# Patient Record
Sex: MALE | Race: WHITE | Employment: FULL TIME | URBAN - METROPOLITAN AREA
[De-identification: names, ages, dates, MRNs, and addresses within clinical notes are randomized per-mention and may not be internally consistent; named-entity substitution may affect disease eponyms.]

---

## 2019-04-15 ENCOUNTER — HOSPITAL ENCOUNTER (OUTPATIENT)
Dept: MRI IMAGING | Age: 59
Discharge: HOME | End: 2019-04-17
Payer: COMMERCIAL

## 2019-04-15 ENCOUNTER — HOSPITAL ENCOUNTER (OUTPATIENT)
Dept: GENERAL RADIOLOGY | Age: 59
Discharge: HOME | End: 2019-04-17
Payer: COMMERCIAL

## 2019-04-15 DIAGNOSIS — M84.822: ICD-10-CM

## 2019-04-15 DIAGNOSIS — T15.90XA FOREIGN BODY, EYE, UNSPECIFIED LATERALITY, INITIAL ENCOUNTER: ICD-10-CM

## 2019-04-15 PROCEDURE — 70030 X-RAY EYE FOR FOREIGN BODY: CPT

## 2019-04-15 PROCEDURE — 73221 MRI JOINT UPR EXTREM W/O DYE: CPT

## 2019-07-02 ENCOUNTER — HOSPITAL ENCOUNTER (OUTPATIENT)
Age: 59
Discharge: HOME | End: 2019-07-04
Payer: COMMERCIAL

## 2019-07-02 LAB
CHOLESTEROL, TOTAL: 182 MG/DL (ref 0–199)
HDLC SERPL-MCNC: 38 MG/DL
LDL CHOLESTEROL CALCULATED: 119 MG/DL (ref 0–99)
TRIGL SERPL-MCNC: 125 MG/DL (ref 0–149)
VLDLC SERPL CALC-MCNC: 25 MG/DL

## 2019-07-02 PROCEDURE — 80061 LIPID PANEL: CPT

## 2019-07-02 PROCEDURE — 36415 COLL VENOUS BLD VENIPUNCTURE: CPT

## 2019-07-05 VITALS
DIASTOLIC BLOOD PRESSURE: 84 MMHG | BODY MASS INDEX: 23.63 KG/M2 | HEART RATE: 72 BPM | WEIGHT: 147 LBS | HEIGHT: 66 IN | SYSTOLIC BLOOD PRESSURE: 138 MMHG

## 2019-07-05 RX ORDER — OMEPRAZOLE 20 MG/1
20 CAPSULE, DELAYED RELEASE ORAL DAILY
COMMUNITY
End: 2019-07-08 | Stop reason: SDUPTHER

## 2019-07-05 RX ORDER — SIMVASTATIN 20 MG
20 TABLET ORAL DAILY
COMMUNITY
End: 2019-07-08 | Stop reason: SDUPTHER

## 2019-07-05 RX ORDER — LISINOPRIL 10 MG/1
10 TABLET ORAL DAILY
COMMUNITY
End: 2019-07-08

## 2019-07-05 RX ORDER — FLUTICASONE PROPIONATE 50 MCG
2 SPRAY, SUSPENSION (ML) NASAL DAILY
COMMUNITY
End: 2019-07-08 | Stop reason: SDUPTHER

## 2019-07-08 ENCOUNTER — OFFICE VISIT (OUTPATIENT)
Dept: FAMILY MEDICINE CLINIC | Age: 59
End: 2019-07-08
Payer: COMMERCIAL

## 2019-07-08 VITALS
RESPIRATION RATE: 16 BRPM | HEIGHT: 66 IN | HEART RATE: 68 BPM | WEIGHT: 147 LBS | OXYGEN SATURATION: 99 % | BODY MASS INDEX: 23.63 KG/M2 | SYSTOLIC BLOOD PRESSURE: 150 MMHG | DIASTOLIC BLOOD PRESSURE: 80 MMHG

## 2019-07-08 DIAGNOSIS — Z12.5 PROSTATE CANCER SCREENING: ICD-10-CM

## 2019-07-08 DIAGNOSIS — M54.16 LUMBAR RADICULOPATHY: Primary | ICD-10-CM

## 2019-07-08 DIAGNOSIS — K21.9 GASTROESOPHAGEAL REFLUX DISEASE WITHOUT ESOPHAGITIS: ICD-10-CM

## 2019-07-08 DIAGNOSIS — I10 ESSENTIAL HYPERTENSION: ICD-10-CM

## 2019-07-08 DIAGNOSIS — E78.5 HYPERLIPIDEMIA, UNSPECIFIED HYPERLIPIDEMIA TYPE: ICD-10-CM

## 2019-07-08 DIAGNOSIS — Z86.010 HISTORY OF COLONIC POLYPS: ICD-10-CM

## 2019-07-08 PROCEDURE — 99214 OFFICE O/P EST MOD 30 MIN: CPT | Performed by: FAMILY MEDICINE

## 2019-07-08 RX ORDER — OMEPRAZOLE 20 MG/1
20 CAPSULE, DELAYED RELEASE ORAL DAILY
Qty: 90 CAPSULE | Refills: 1 | Status: SHIPPED | OUTPATIENT
Start: 2019-07-08 | End: 2020-01-16 | Stop reason: SDUPTHER

## 2019-07-08 RX ORDER — FLUTICASONE PROPIONATE 50 MCG
2 SPRAY, SUSPENSION (ML) NASAL DAILY
Qty: 3 BOTTLE | Refills: 1 | Status: SHIPPED | OUTPATIENT
Start: 2019-07-08 | End: 2020-01-16 | Stop reason: SDUPTHER

## 2019-07-08 RX ORDER — LISINOPRIL 20 MG/1
20 TABLET ORAL DAILY
Qty: 90 TABLET | Refills: 1 | Status: SHIPPED | OUTPATIENT
Start: 2019-07-08 | End: 2020-01-16 | Stop reason: SDUPTHER

## 2019-07-08 RX ORDER — SIMVASTATIN 20 MG
20 TABLET ORAL DAILY
Qty: 90 TABLET | Refills: 1 | Status: SHIPPED | OUTPATIENT
Start: 2019-07-08 | End: 2020-01-16 | Stop reason: SDUPTHER

## 2019-07-08 RX ORDER — LISINOPRIL 20 MG/1
20 TABLET ORAL DAILY
COMMUNITY
End: 2019-07-08 | Stop reason: SDUPTHER

## 2019-07-08 RX ORDER — LISINOPRIL 10 MG/1
10 TABLET ORAL DAILY
Qty: 90 TABLET | Refills: 1 | Status: CANCELLED | OUTPATIENT
Start: 2019-07-08

## 2019-07-08 ASSESSMENT — ENCOUNTER SYMPTOMS
ALLERGIC/IMMUNOLOGIC NEGATIVE: 1
CHEST TIGHTNESS: 0
BACK PAIN: 1
SHORTNESS OF BREATH: 0
ABDOMINAL PAIN: 0

## 2019-07-08 ASSESSMENT — PATIENT HEALTH QUESTIONNAIRE - PHQ9
2. FEELING DOWN, DEPRESSED OR HOPELESS: 0
SUM OF ALL RESPONSES TO PHQ QUESTIONS 1-9: 0
SUM OF ALL RESPONSES TO PHQ QUESTIONS 1-9: 0
1. LITTLE INTEREST OR PLEASURE IN DOING THINGS: 0
SUM OF ALL RESPONSES TO PHQ9 QUESTIONS 1 & 2: 0

## 2019-07-08 NOTE — PROGRESS NOTES
07/08/19     Allison You     1960     60 y.o. Chief Complaint   Patient presents with    Hypertension    Hyperlipidemia    Gastroesophageal Reflux        Patient presents for recheck of hypertension and hyperlipidemia. Denies chest pain, edema, fatigue, palpitations and syncope. Compliance reviewed. No medication side effects noted. Pt c/o increased pain and swelling right knee       Review of Systems   Constitutional: Negative for activity change and appetite change. HENT: Negative for congestion. Eyes: Negative for visual disturbance. Respiratory: Negative for chest tightness and shortness of breath. Cardiovascular: Negative for chest pain, palpitations and leg swelling. Gastrointestinal: Negative for abdominal pain. Endocrine: Negative for cold intolerance and heat intolerance. Genitourinary: Negative for difficulty urinating. Musculoskeletal: Positive for arthralgias and back pain. Skin: Negative. Negative for rash. Allergic/Immunologic: Negative. Neurological: Negative for dizziness, syncope and light-headedness. Hematological: Negative. Negative for adenopathy. Psychiatric/Behavioral: Negative. No problem-specific Assessment & Plan notes found for this encounter. Current Outpatient Medications   Medication Sig Dispense Refill    fluticasone (FLONASE) 50 MCG/ACT nasal spray 2 sprays by Each Nostril route daily 3 Bottle 1    simvastatin (ZOCOR) 20 MG tablet Take 1 tablet by mouth daily 90 tablet 1    omeprazole (PRILOSEC) 20 MG delayed release capsule Take 1 capsule by mouth daily 90 capsule 1    lisinopril (PRINIVIL;ZESTRIL) 20 MG tablet Take 1 tablet by mouth daily 90 tablet 1     No current facility-administered medications for this visit.          No Known Allergies     Social History     Socioeconomic History    Marital status: Single     Spouse name: Not on file    Number of children: Not on file    Years of education: Not on file   

## 2020-01-09 ENCOUNTER — HOSPITAL ENCOUNTER (OUTPATIENT)
Age: 60
Discharge: HOME | End: 2020-01-11
Payer: COMMERCIAL

## 2020-01-09 LAB
ALBUMIN SERPL-MCNC: 4.4 G/DL (ref 3.5–5.2)
ALP BLD-CCNC: 71 U/L (ref 40–129)
ALT SERPL-CCNC: 12 U/L (ref 0–40)
ANION GAP SERPL CALCULATED.3IONS-SCNC: 16 MMOL/L (ref 7–16)
AST SERPL-CCNC: 18 U/L (ref 0–39)
BASOPHILS ABSOLUTE: 0.11 E9/L (ref 0–0.2)
BASOPHILS RELATIVE PERCENT: 1.6 % (ref 0–2)
BILIRUB SERPL-MCNC: 0.4 MG/DL (ref 0–1.2)
BUN BLDV-MCNC: 9 MG/DL (ref 8–23)
CALCIUM SERPL-MCNC: 9.4 MG/DL (ref 8.6–10.2)
CHLORIDE BLD-SCNC: 100 MMOL/L (ref 98–107)
CHOLESTEROL, TOTAL: 167 MG/DL (ref 0–199)
CO2: 22 MMOL/L (ref 22–29)
CREAT SERPL-MCNC: 0.8 MG/DL (ref 0.7–1.2)
EOSINOPHILS ABSOLUTE: 0.63 E9/L (ref 0.05–0.5)
EOSINOPHILS RELATIVE PERCENT: 8.9 % (ref 0–6)
GFR AFRICAN AMERICAN: >60
GFR NON-AFRICAN AMERICAN: >60 ML/MIN/1.73
GLUCOSE BLD-MCNC: 104 MG/DL (ref 74–99)
HCT VFR BLD CALC: 46.3 % (ref 37–54)
HDLC SERPL-MCNC: 33 MG/DL
HEMOGLOBIN: 15.2 G/DL (ref 12.5–16.5)
IMMATURE GRANULOCYTES #: 0.02 E9/L
IMMATURE GRANULOCYTES %: 0.3 % (ref 0–5)
LDL CHOLESTEROL CALCULATED: 118 MG/DL (ref 0–99)
LYMPHOCYTES ABSOLUTE: 2.03 E9/L (ref 1.5–4)
LYMPHOCYTES RELATIVE PERCENT: 28.7 % (ref 20–42)
MCH RBC QN AUTO: 29.7 PG (ref 26–35)
MCHC RBC AUTO-ENTMCNC: 32.8 % (ref 32–34.5)
MCV RBC AUTO: 90.6 FL (ref 80–99.9)
MONOCYTES ABSOLUTE: 0.89 E9/L (ref 0.1–0.95)
MONOCYTES RELATIVE PERCENT: 12.6 % (ref 2–12)
NEUTROPHILS ABSOLUTE: 3.4 E9/L (ref 1.8–7.3)
NEUTROPHILS RELATIVE PERCENT: 47.9 % (ref 43–80)
PDW BLD-RTO: 12.7 FL (ref 11.5–15)
PLATELET # BLD: 201 E9/L (ref 130–450)
PMV BLD AUTO: 11.8 FL (ref 7–12)
POTASSIUM SERPL-SCNC: 4.7 MMOL/L (ref 3.5–5)
PROSTATE SPECIFIC ANTIGEN: 2.15 NG/ML (ref 0–4)
RBC # BLD: 5.11 E12/L (ref 3.8–5.8)
SODIUM BLD-SCNC: 138 MMOL/L (ref 132–146)
TOTAL PROTEIN: 6.9 G/DL (ref 6.4–8.3)
TRIGL SERPL-MCNC: 82 MG/DL (ref 0–149)
VLDLC SERPL CALC-MCNC: 16 MG/DL
WBC # BLD: 7.1 E9/L (ref 4.5–11.5)

## 2020-01-09 PROCEDURE — G0103 PSA SCREENING: HCPCS

## 2020-01-09 PROCEDURE — 80053 COMPREHEN METABOLIC PANEL: CPT

## 2020-01-09 PROCEDURE — 36415 COLL VENOUS BLD VENIPUNCTURE: CPT

## 2020-01-09 PROCEDURE — 85025 COMPLETE CBC W/AUTO DIFF WBC: CPT

## 2020-01-09 PROCEDURE — 80061 LIPID PANEL: CPT

## 2020-01-14 ASSESSMENT — ENCOUNTER SYMPTOMS
CHEST TIGHTNESS: 0
SHORTNESS OF BREATH: 0
ABDOMINAL PAIN: 0
BLOOD IN STOOL: 0

## 2020-01-14 NOTE — PROGRESS NOTES
Left     SIDE OF FACE     Family History   Problem Relation Age of Onset    Breast Cancer Mother     High Blood Pressure Father     Heart Disease Father     Heart Attack Father     Prostate Cancer Father      Social History     Socioeconomic History    Marital status: Single     Spouse name: Not on file    Number of children: Not on file    Years of education: Not on file    Highest education level: Not on file   Occupational History    Not on file   Social Needs    Financial resource strain: Not on file    Food insecurity:     Worry: Not on file     Inability: Not on file    Transportation needs:     Medical: Not on file     Non-medical: Not on file   Tobacco Use    Smoking status: Current Every Day Smoker     Packs/day: 1.00     Years: 48.00     Pack years: 48.00     Types: Cigarettes    Smokeless tobacco: Never Used   Substance and Sexual Activity    Alcohol use: Never     Frequency: Never    Drug use: Never    Sexual activity: Not on file   Lifestyle    Physical activity:     Days per week: Not on file     Minutes per session: Not on file    Stress: Not on file   Relationships    Social connections:     Talks on phone: Not on file     Gets together: Not on file     Attends [employer] service: Not on file     Active member of club or organization: Not on file     Attends meetings of clubs or organizations: Not on file     Relationship status: Not on file    Intimate partner violence:     Fear of current or ex partner: Not on file     Emotionally abused: Not on file     Physically abused: Not on file     Forced sexual activity: Not on file   Other Topics Concern    Not on file   Social History Narrative    Not on file       Vitals:    01/16/20 0707 01/16/20 0722   BP: 120/66 118/70   Site: Right Upper Arm    Position: Sitting    Cuff Size: Medium Adult    Pulse: 87    Temp: 97.3 °F (36.3 °C)    TempSrc: Temporal    SpO2: 97%    Weight: 143 lb (64.9 kg)    Height: 5' 6\" (1.676 m) 20mg  Orders:  -     omeprazole (PRILOSEC) 20 MG delayed release capsule; Take 1 capsule by mouth daily    Encounter for screening colonoscopy  -     Jeremy Barragan MD, General Surgery, [facility]    Other orders  -     fluticasone (FLONASE) 50 MCG/ACT nasal spray; 2 sprays by Each Nostril route daily          Return in about 6 months (around 7/16/2020). Seen By:  Marcus Garrett, DO          This note has been transcribed by Efrem Avery under the direction of Dr. Jacki Dominguez. I, Dr. Filipe Tong, personally performed the services described in this documentation as scribed by Efrem Avery in my presence, and it is both accurate and complete.

## 2020-01-16 ENCOUNTER — OFFICE VISIT (OUTPATIENT)
Dept: FAMILY MEDICINE CLINIC | Age: 60
End: 2020-01-16
Payer: COMMERCIAL

## 2020-01-16 ENCOUNTER — TELEPHONE (OUTPATIENT)
Dept: HEMATOLOGY | Age: 60
End: 2020-01-16

## 2020-01-16 VITALS
SYSTOLIC BLOOD PRESSURE: 118 MMHG | OXYGEN SATURATION: 97 % | HEART RATE: 87 BPM | TEMPERATURE: 97.3 F | WEIGHT: 143 LBS | DIASTOLIC BLOOD PRESSURE: 70 MMHG | BODY MASS INDEX: 22.98 KG/M2 | HEIGHT: 66 IN

## 2020-01-16 PROBLEM — K21.9 GASTROESOPHAGEAL REFLUX DISEASE WITHOUT ESOPHAGITIS: Status: ACTIVE | Noted: 2020-01-16

## 2020-01-16 LAB
BILIRUBIN, POC: NORMAL
BLOOD URINE, POC: NORMAL
CLARITY, POC: CLEAR
COLOR, POC: YELLOW
GLUCOSE URINE, POC: NORMAL
KETONES, POC: NORMAL
LEUKOCYTE EST, POC: NORMAL
NITRITE, POC: NORMAL
PH, POC: 7
PROTEIN, POC: NORMAL
SPECIFIC GRAVITY, POC: 1.01
UROBILINOGEN, POC: 1

## 2020-01-16 PROCEDURE — G8420 CALC BMI NORM PARAMETERS: HCPCS | Performed by: FAMILY MEDICINE

## 2020-01-16 PROCEDURE — 81002 URINALYSIS NONAUTO W/O SCOPE: CPT | Performed by: FAMILY MEDICINE

## 2020-01-16 PROCEDURE — 4004F PT TOBACCO SCREEN RCVD TLK: CPT | Performed by: FAMILY MEDICINE

## 2020-01-16 PROCEDURE — G8484 FLU IMMUNIZE NO ADMIN: HCPCS | Performed by: FAMILY MEDICINE

## 2020-01-16 PROCEDURE — G8427 DOCREV CUR MEDS BY ELIG CLIN: HCPCS | Performed by: FAMILY MEDICINE

## 2020-01-16 PROCEDURE — 99214 OFFICE O/P EST MOD 30 MIN: CPT | Performed by: FAMILY MEDICINE

## 2020-01-16 PROCEDURE — 3017F COLORECTAL CA SCREEN DOC REV: CPT | Performed by: FAMILY MEDICINE

## 2020-01-16 RX ORDER — LISINOPRIL 20 MG/1
20 TABLET ORAL DAILY
Qty: 90 TABLET | Refills: 2 | Status: SHIPPED
Start: 2020-01-16 | End: 2020-06-30 | Stop reason: SDUPTHER

## 2020-01-16 RX ORDER — SIMVASTATIN 20 MG
20 TABLET ORAL DAILY
Qty: 90 TABLET | Refills: 2 | Status: SHIPPED
Start: 2020-01-16 | End: 2020-06-30 | Stop reason: SDUPTHER

## 2020-01-16 RX ORDER — FLUTICASONE PROPIONATE 50 MCG
2 SPRAY, SUSPENSION (ML) NASAL DAILY
Qty: 1 BOTTLE | Refills: 6 | Status: SHIPPED
Start: 2020-01-16 | End: 2020-06-30 | Stop reason: SDUPTHER

## 2020-01-16 RX ORDER — OMEPRAZOLE 20 MG/1
20 CAPSULE, DELAYED RELEASE ORAL DAILY
Qty: 90 CAPSULE | Refills: 2 | Status: SHIPPED
Start: 2020-01-16 | End: 2020-06-30 | Stop reason: SDUPTHER

## 2020-01-16 SDOH — HEALTH STABILITY: MENTAL HEALTH: HOW OFTEN DO YOU HAVE A DRINK CONTAINING ALCOHOL?: NEVER

## 2020-01-16 ASSESSMENT — PATIENT HEALTH QUESTIONNAIRE - PHQ9
SUM OF ALL RESPONSES TO PHQ9 QUESTIONS 1 & 2: 0
SUM OF ALL RESPONSES TO PHQ QUESTIONS 1-9: 0
1. LITTLE INTEREST OR PLEASURE IN DOING THINGS: 0
2. FEELING DOWN, DEPRESSED OR HOPELESS: 0
SUM OF ALL RESPONSES TO PHQ QUESTIONS 1-9: 0

## 2020-01-21 ENCOUNTER — TELEPHONE (OUTPATIENT)
Dept: HEMATOLOGY | Age: 60
End: 2020-01-21

## 2020-01-31 ENCOUNTER — TELEPHONE (OUTPATIENT)
Dept: HEMATOLOGY | Age: 60
End: 2020-01-31

## 2020-02-03 ENCOUNTER — TELEPHONE (OUTPATIENT)
Dept: HEMATOLOGY | Age: 60
End: 2020-02-03

## 2020-02-03 ENCOUNTER — OFFICE VISIT (OUTPATIENT)
Dept: SURGERY | Age: 60
End: 2020-02-03

## 2020-02-03 VITALS
HEART RATE: 74 BPM | WEIGHT: 145 LBS | HEIGHT: 66 IN | SYSTOLIC BLOOD PRESSURE: 150 MMHG | TEMPERATURE: 98.4 F | DIASTOLIC BLOOD PRESSURE: 77 MMHG | BODY MASS INDEX: 23.3 KG/M2

## 2020-02-03 PROCEDURE — 99999 PR OFFICE/OUTPT VISIT,PROCEDURE ONLY: CPT | Performed by: TRANSPLANT SURGERY

## 2020-02-03 ASSESSMENT — ENCOUNTER SYMPTOMS
NAUSEA: 0
SHORTNESS OF BREATH: 0
BACK PAIN: 0
PHOTOPHOBIA: 0
ABDOMINAL PAIN: 0
EYE PAIN: 0
BLOOD IN STOOL: 0
VOMITING: 0
CONSTIPATION: 0
EYE DISCHARGE: 0
DIARRHEA: 0

## 2020-02-03 NOTE — PROGRESS NOTES
Medical: Not on file     Non-medical: Not on file   Tobacco Use    Smoking status: Current Every Day Smoker     Packs/day: 1.00     Years: 48.00     Pack years: 48.00     Types: Cigarettes    Smokeless tobacco: Never Used   Substance and Sexual Activity    Alcohol use: Never     Frequency: Never    Drug use: Never    Sexual activity: Not on file   Lifestyle    Physical activity:     Days per week: Not on file     Minutes per session: Not on file    Stress: Not on file   Relationships    Social connections:     Talks on phone: Not on file     Gets together: Not on file     Attends Worship service: Not on file     Active member of club or organization: Not on file     Attends meetings of clubs or organizations: Not on file     Relationship status: Not on file    Intimate partner violence:     Fear of current or ex partner: Not on file     Emotionally abused: Not on file     Physically abused: Not on file     Forced sexual activity: Not on file   Other Topics Concern    Not on file   Social History Narrative    Not on file       ROS:   Review of Systems   Constitutional: Negative for chills, diaphoresis and fever. HENT: Negative for congestion, ear discharge, ear pain, hearing loss, nosebleeds and tinnitus. Eyes: Negative for photophobia, pain and discharge. Respiratory: Negative for shortness of breath. Cardiovascular: Negative for palpitations and leg swelling. Gastrointestinal: Negative for abdominal pain, blood in stool, constipation, diarrhea, nausea and vomiting. Endocrine: Negative for polydipsia. Genitourinary: Negative for frequency, hematuria and urgency. Musculoskeletal: Negative for back pain and neck pain. Skin: Negative for rash. Allergic/Immunologic: Negative for environmental allergies. Neurological: Negative for tremors and seizures. Psychiatric/Behavioral: Negative for hallucinations and suicidal ideas. The patient is not nervous/anxious.         Physical

## 2020-03-15 ENCOUNTER — ANESTHESIA EVENT (OUTPATIENT)
Dept: ENDOSCOPY | Age: 60
End: 2020-03-15
Payer: COMMERCIAL

## 2020-03-15 ASSESSMENT — LIFESTYLE VARIABLES: SMOKING_STATUS: 1

## 2020-03-15 NOTE — H&P
Hepatobiliary and Pancreatic Surgery Attending History and Physical     Patient's Name/Date of Birth: Hill Thomas /1960 (30 y.o.)     Date: March 15, 2020      CC: Screening colonoscopy     HPI:  Patient is a very pleasant 60year old male whom had a colonoscopy about 6 years ago. He states that polyps were removed. He denies any weight loss. He has never had diverticulitis. Denies a family history of colon cancer. He moves his bowels daily. He has had an appendectomy and a hernia repair.   He smokes about 1.5 PPD.       Past Medical History        Past Medical History:   Diagnosis Date    Hyperlipidemia      Hypertension              Past Surgical History         Past Surgical History:   Procedure Laterality Date    APPENDECTOMY        HERNIA REPAIR        SKIN CANCER EXCISION Left       SIDE OF FACE            Current Facility-Administered Medications          Current Outpatient Medications   Medication Sig Dispense Refill    lisinopril (PRINIVIL;ZESTRIL) 20 MG tablet Take 1 tablet by mouth daily 90 tablet 2    omeprazole (PRILOSEC) 20 MG delayed release capsule Take 1 capsule by mouth daily 90 capsule 2    fluticasone (FLONASE) 50 MCG/ACT nasal spray 2 sprays by Each Nostril route daily 1 Bottle 6    simvastatin (ZOCOR) 20 MG tablet Take 1 tablet by mouth daily 90 tablet 2      No current facility-administered medications for this visit.             No Known Allergies     Family History         Family History   Problem Relation Age of Onset    Breast Cancer Mother      Cancer Mother      High Blood Pressure Father      Heart Disease Father      Heart Attack Father      Prostate Cancer Father      Prostate Cancer Sister      Other Sister              Social History               Socioeconomic History    Marital status: Single       Spouse name: Not on file    Number of children: Not on file    Years of education: Not on file    Highest education level: Not on file   Occupational History    Not on file   Social Needs    Financial resource strain: Not on file    Food insecurity:       Worry: Not on file       Inability: Not on file    Transportation needs:       Medical: Not on file       Non-medical: Not on file   Tobacco Use    Smoking status: Current Every Day Smoker       Packs/day: 1.00       Years: 48.00       Pack years: 48.00       Types: Cigarettes    Smokeless tobacco: Never Used   Substance and Sexual Activity    Alcohol use: Never       Frequency: Never    Drug use: Never    Sexual activity: Not on file   Lifestyle    Physical activity:       Days per week: Not on file       Minutes per session: Not on file    Stress: Not on file   Relationships    Social connections:       Talks on phone: Not on file       Gets together: Not on file       Attends [employer] service: Not on file       Active member of club or organization: Not on file       Attends meetings of clubs or organizations: Not on file       Relationship status: Not on file    Intimate partner violence:       Fear of current or ex partner: Not on file       Emotionally abused: Not on file       Physically abused: Not on file       Forced sexual activity: Not on file   Other Topics Concern    Not on file   Social History Narrative    Not on file            ROS:   Review of Systems   Constitutional: Negative for chills, diaphoresis and fever. HENT: Negative for congestion, ear discharge, ear pain, hearing loss, nosebleeds and tinnitus. Eyes: Negative for photophobia, pain and discharge. Respiratory: Negative for shortness of breath. Cardiovascular: Negative for palpitations and leg swelling. Gastrointestinal: Negative for abdominal pain, blood in stool, constipation, diarrhea, nausea and vomiting. Endocrine: Negative for polydipsia. Genitourinary: Negative for frequency, hematuria and urgency. Musculoskeletal: Negative for back pain and neck pain. Skin: Negative for rash.

## 2020-03-16 ENCOUNTER — HOSPITAL ENCOUNTER (OUTPATIENT)
Age: 60
Setting detail: OUTPATIENT SURGERY
Discharge: HOME | End: 2020-03-16
Attending: TRANSPLANT SURGERY | Admitting: TRANSPLANT SURGERY
Payer: COMMERCIAL

## 2020-03-16 ENCOUNTER — ANESTHESIA (OUTPATIENT)
Dept: ENDOSCOPY | Age: 60
End: 2020-03-16
Payer: COMMERCIAL

## 2020-03-16 VITALS
HEIGHT: 66 IN | HEART RATE: 71 BPM | RESPIRATION RATE: 18 BRPM | DIASTOLIC BLOOD PRESSURE: 78 MMHG | WEIGHT: 143 LBS | OXYGEN SATURATION: 99 % | BODY MASS INDEX: 22.98 KG/M2 | TEMPERATURE: 97.9 F | SYSTOLIC BLOOD PRESSURE: 129 MMHG

## 2020-03-16 VITALS
OXYGEN SATURATION: 100 % | DIASTOLIC BLOOD PRESSURE: 80 MMHG | RESPIRATION RATE: 17 BRPM | SYSTOLIC BLOOD PRESSURE: 156 MMHG

## 2020-03-16 PROBLEM — Z12.11 ENCOUNTER FOR SCREENING COLONOSCOPY: Status: ACTIVE | Noted: 2020-03-16

## 2020-03-16 PROCEDURE — 7100000010 HC PHASE II RECOVERY - FIRST 15 MIN: Performed by: TRANSPLANT SURGERY

## 2020-03-16 PROCEDURE — 2580000003 HC RX 258: Performed by: NURSE ANESTHETIST, CERTIFIED REGISTERED

## 2020-03-16 PROCEDURE — 3700000001 HC ADD 15 MINUTES (ANESTHESIA): Performed by: TRANSPLANT SURGERY

## 2020-03-16 PROCEDURE — 6360000002 HC RX W HCPCS: Performed by: NURSE ANESTHETIST, CERTIFIED REGISTERED

## 2020-03-16 PROCEDURE — 3609027000 HC COLONOSCOPY: Performed by: TRANSPLANT SURGERY

## 2020-03-16 PROCEDURE — 2709999900 HC NON-CHARGEABLE SUPPLY: Performed by: TRANSPLANT SURGERY

## 2020-03-16 PROCEDURE — 7100000011 HC PHASE II RECOVERY - ADDTL 15 MIN: Performed by: TRANSPLANT SURGERY

## 2020-03-16 PROCEDURE — 45378 DIAGNOSTIC COLONOSCOPY: CPT | Performed by: TRANSPLANT SURGERY

## 2020-03-16 PROCEDURE — 3700000000 HC ANESTHESIA ATTENDED CARE: Performed by: TRANSPLANT SURGERY

## 2020-03-16 RX ORDER — PROPOFOL 10 MG/ML
INJECTION, EMULSION INTRAVENOUS PRN
Status: DISCONTINUED | OUTPATIENT
Start: 2020-03-16 | End: 2020-03-16 | Stop reason: SDUPTHER

## 2020-03-16 RX ORDER — SODIUM CHLORIDE 0.9 % (FLUSH) 0.9 %
10 SYRINGE (ML) INJECTION EVERY 12 HOURS SCHEDULED
Status: DISCONTINUED | OUTPATIENT
Start: 2020-03-16 | End: 2020-03-17 | Stop reason: HOSPADM

## 2020-03-16 RX ORDER — SODIUM CHLORIDE 9 MG/ML
INJECTION, SOLUTION INTRAVENOUS CONTINUOUS PRN
Status: DISCONTINUED | OUTPATIENT
Start: 2020-03-16 | End: 2020-03-16 | Stop reason: SDUPTHER

## 2020-03-16 RX ORDER — SODIUM CHLORIDE 0.9 % (FLUSH) 0.9 %
10 SYRINGE (ML) INJECTION PRN
Status: DISCONTINUED | OUTPATIENT
Start: 2020-03-16 | End: 2020-03-17 | Stop reason: HOSPADM

## 2020-03-16 RX ADMIN — PROPOFOL 250 MG: 10 INJECTION, EMULSION INTRAVENOUS at 12:28

## 2020-03-16 RX ADMIN — SODIUM CHLORIDE: 9 INJECTION, SOLUTION INTRAVENOUS at 12:18

## 2020-03-16 NOTE — OP NOTE
PROCEDURE NOTE    DATE OF PROCEDURE: 3/16/2020    SURGEON: Chyrl Shone, MD    ASSISTANT: None    PREOPERATIVE DIAGNOSIS: Low risk colorectal cancer screening    POSTOPERATIVE DIAGNOSIS: Same, tortuous colon    OPERATION: Total colonoscopy to the cecum    ANESTHESIA: Local monitored anesthesia. ESTIMATED BLOOD LOSS (ml): less than 50     COMPLICATIONS: None    SPECIMENS:  Was Not Obtained    HISTORY: The patient is a 60y.o. year old male with history of above preop diagnosis. I recommended colonoscopy with possible biopsy or polypectomy and I explained the risk, benefits, expected outcome, and alternatives to the procedure. Risks included but are not limited to bleeding, infection, respiratory distress, hypotension, and perforation of the colon. The patient understands and is in agreement. PROCEDURE: The patient was given IV conscious sedation per anesthesia. The patient was given supplemental oxygen by nasal cannula. The colonoscope was inserted per rectum and advanced under direct vision to the cecum with difficulty. The prep was good so exam was adequate. FINDINGS:  Cecum/Ascending colon: normal    Transverse colon: tortuous but normal    Descending/Sigmoid colon: tortuous but normal    Rectum/Anus: examined in normal and retroflexed positions and was normal    The colon was decompressed and the scope was removed. The withdraw time was approximately 13 minutes. The patient tolerated the procedure well. ASSESSMENT/PLAN:   1.  Recommend follow up colonoscopy in 5 years    Electronically signed by Chyrl Shone, MD on 3/16/20 at 1:23 PM EDT

## 2020-03-16 NOTE — INTERVAL H&P NOTE
H&P Update    Patient's History and Physical from March 15, 2020 was reviewed. Patient examined. There has been no change.     Electronically signed by Vj Singh MD on 3/16/20 at 1:18 PM EDT

## 2020-03-16 NOTE — ANESTHESIA PRE PROCEDURE
Department of Anesthesiology  Preprocedure Note       Name:  Dee Fitch   Age:  60 y.o.  :  1960                                          MRN:  [id number]         Date:  3/16/2020      Surgeon: Diego King):  Ping Das MD    Procedure: COLORECTAL CANCER SCREENING, NOT HIGH RISK (N/A )    Medications prior to admission:   Prior to Admission medications    Medication Sig Start Date End Date Taking?  Authorizing Provider   lisinopril (PRINIVIL;ZESTRIL) 20 MG tablet Take 1 tablet by mouth daily 20  Yes Lencho Blake DO   omeprazole (PRILOSEC) 20 MG delayed release capsule Take 1 capsule by mouth daily 20  Yes Lencho Blake DO   fluticasone Jodean Llanes) 50 MCG/ACT nasal spray 2 sprays by Each Nostril route daily 20  Yes Lencho Blake DO   simvastatin (ZOCOR) 20 MG tablet Take 1 tablet by mouth daily 20  Yes Samm Johns DO       Current medications:    Current Facility-Administered Medications   Medication Dose Route Frequency Provider Last Rate Last Dose    sodium chloride flush 0.9 % injection 10 mL  10 mL Intravenous 2 times per day Amber Miles III, MD        sodium chloride flush 0.9 % injection 10 mL  10 mL Intravenous PRN Amber Miles III, MD           Allergies:  No Known Allergies    Problem List:    Patient Active Problem List   Diagnosis Code    Gastroesophageal reflux disease without esophagitis K21.9       Past Medical History:        Diagnosis Date    Heartburn     Hyperlipidemia     Hypertension     Polyp of colon     Smokers' cough ([facility] [address]        Past Surgical History:        Procedure Laterality Date    APPENDECTOMY      COLONOSCOPY      HERNIA REPAIR      SKIN CANCER EXCISION Left     SIDE OF FACE       Social History:    Social History     Tobacco Use    Smoking status: Current Every Day Smoker     Packs/day: 1.00     Years: 48.00     Pack years: 48.00     Types: Cigarettes    Smokeless tobacco: Never Used   Substance Use Topics    Alcohol use: Never     Frequency: Never                                Ready to quit: Not Answered  Counseling given: Not Answered      Vital Signs (Current):   Vitals:    03/10/20 1307 03/16/20 1100   BP:  (!) 185/80   Pulse:  80   Resp:  16   SpO2:  98%   Weight: 143 lb (64.9 kg)    Height: 5' 6\" (1.676 m)                                               BP Readings from Last 3 Encounters:   03/16/20 (!) 185/80   02/03/20 (!) 150/77   01/16/20 118/70       NPO Status: Time of last liquid consumption: 2300                        Time of last solid consumption: 1800                        Date of last liquid consumption: 03/15/20                        Date of last solid food consumption: 03/14/20    BMI:   Wt Readings from Last 3 Encounters:   03/10/20 143 lb (64.9 kg)   02/03/20 145 lb (65.8 kg)   01/16/20 143 lb (64.9 kg)     Body mass index is 23.08 kg/m². CBC:   Lab Results   Component Value Date    WBC 7.1 01/09/2020    RBC 5.11 01/09/2020    HGB 15.2 01/09/2020    HCT 46.3 01/09/2020    MCV 90.6 01/09/2020    RDW 12.7 01/09/2020     01/09/2020       CMP:   Lab Results   Component Value Date     01/09/2020    K 4.7 01/09/2020     01/09/2020    CO2 22 01/09/2020    BUN 9 01/09/2020    CREATININE 0.8 01/09/2020    GFRAA >60 01/09/2020    LABGLOM >60 01/09/2020    GLUCOSE 104 01/09/2020    PROT 6.9 01/09/2020    CALCIUM 9.4 01/09/2020    BILITOT 0.4 01/09/2020    ALKPHOS 71 01/09/2020    AST 18 01/09/2020    ALT 12 01/09/2020       POC Tests: No results for input(s): POCGLU, POCNA, POCK, POCCL, POCBUN, POCHEMO, POCHCT in the last 72 hours.     Coags: No results found for: PROTIME, INR, APTT    HCG (If Applicable): No results found for: PREGTESTUR, PREGSERUM, HCG, HCGQUANT     ABGs: No results found for: PHART, PO2ART, NKW0KXJ, JOL4TTO, BEART, [id number]     Type & Screen (If Applicable):  No results found for: ANAM [facility]    Anesthesia Evaluation  Patient summary reviewed no history of anesthetic complications:   Airway: Mallampati: II  TM distance: >3 FB     Mouth opening: > = 3 FB Dental:    (+) edentulous      Pulmonary: breath sounds clear to auscultation  (+) COPD:  current smoker                          ROS comment: Sinusitis - on prn flonase   Cardiovascular:    (+) hypertension:, hyperlipidemia    (-) past MI, CAD and CABG/stent      Rhythm: regular  Rate: normal                    Neuro/Psych:   Negative Neuro/Psych ROS              GI/Hepatic/Renal:   (+) GERD:, bowel prep,      (-) liver disease and no renal disease       Endo/Other: Negative Endo/Other ROS                    Abdominal:           Vascular: negative vascular ROS. Anesthesia Plan      MAC     ASA 3     (Backup GA if needed.  )  Induction: intravenous. Anesthetic plan and risks discussed with patient. Plan discussed with CRNA.                 Eh Roper MD   3/16/2020

## 2020-04-15 PROBLEM — Z12.11 ENCOUNTER FOR SCREENING COLONOSCOPY: Status: RESOLVED | Noted: 2020-03-16 | Resolved: 2020-04-15

## 2020-06-30 ENCOUNTER — OFFICE VISIT (OUTPATIENT)
Dept: FAMILY MEDICINE CLINIC | Age: 60
End: 2020-06-30
Payer: COMMERCIAL

## 2020-06-30 VITALS
RESPIRATION RATE: 18 BRPM | WEIGHT: 141 LBS | DIASTOLIC BLOOD PRESSURE: 68 MMHG | HEIGHT: 66 IN | SYSTOLIC BLOOD PRESSURE: 126 MMHG | TEMPERATURE: 97.5 F | OXYGEN SATURATION: 97 % | HEART RATE: 51 BPM | BODY MASS INDEX: 22.66 KG/M2

## 2020-06-30 PROCEDURE — 99213 OFFICE O/P EST LOW 20 MIN: CPT | Performed by: PHYSICIAN ASSISTANT

## 2020-06-30 RX ORDER — FLUTICASONE PROPIONATE 50 MCG
2 SPRAY, SUSPENSION (ML) NASAL DAILY
Qty: 1 BOTTLE | Refills: 6 | Status: SHIPPED | OUTPATIENT
Start: 2020-06-30

## 2020-06-30 RX ORDER — LISINOPRIL 20 MG/1
20 TABLET ORAL DAILY
Qty: 90 TABLET | Refills: 2 | Status: SHIPPED | OUTPATIENT
Start: 2020-06-30

## 2020-06-30 RX ORDER — SIMVASTATIN 20 MG
20 TABLET ORAL DAILY
Qty: 90 TABLET | Refills: 2 | Status: SHIPPED | OUTPATIENT
Start: 2020-06-30

## 2020-06-30 RX ORDER — OMEPRAZOLE 20 MG/1
20 CAPSULE, DELAYED RELEASE ORAL DAILY
Qty: 90 CAPSULE | Refills: 2 | Status: SHIPPED | OUTPATIENT
Start: 2020-06-30

## 2020-06-30 ASSESSMENT — ENCOUNTER SYMPTOMS
ALLERGIC/IMMUNOLOGIC NEGATIVE: 1
RESPIRATORY NEGATIVE: 1
GASTROINTESTINAL NEGATIVE: 1
EYES NEGATIVE: 1

## 2020-10-08 ENCOUNTER — OFFICE VISIT (OUTPATIENT)
Dept: FAMILY MEDICINE CLINIC | Age: 60
End: 2020-10-08
Payer: COMMERCIAL

## 2020-10-08 VITALS
BODY MASS INDEX: 24.11 KG/M2 | WEIGHT: 150 LBS | HEART RATE: 78 BPM | DIASTOLIC BLOOD PRESSURE: 68 MMHG | HEIGHT: 66 IN | TEMPERATURE: 97.1 F | SYSTOLIC BLOOD PRESSURE: 132 MMHG | OXYGEN SATURATION: 97 %

## 2020-10-08 PROCEDURE — G8427 DOCREV CUR MEDS BY ELIG CLIN: HCPCS | Performed by: INTERNAL MEDICINE

## 2020-10-08 PROCEDURE — G8484 FLU IMMUNIZE NO ADMIN: HCPCS | Performed by: INTERNAL MEDICINE

## 2020-10-08 PROCEDURE — 3017F COLORECTAL CA SCREEN DOC REV: CPT | Performed by: INTERNAL MEDICINE

## 2020-10-08 PROCEDURE — 99213 OFFICE O/P EST LOW 20 MIN: CPT | Performed by: INTERNAL MEDICINE

## 2020-10-08 PROCEDURE — G8420 CALC BMI NORM PARAMETERS: HCPCS | Performed by: INTERNAL MEDICINE

## 2020-10-08 PROCEDURE — 4004F PT TOBACCO SCREEN RCVD TLK: CPT | Performed by: INTERNAL MEDICINE

## 2020-10-09 ENCOUNTER — NURSE ONLY (OUTPATIENT)
Dept: ORTHOPEDIC SURGERY | Age: 60
End: 2020-10-09

## 2020-10-09 NOTE — PROGRESS NOTES
[facility]     10/9/20  Yanyd Singh : 1960 Sex: male  Age: 63 y.o. Chief Complaint   Patient presents with    Hand Pain     right hand, middle finger; swollen and cant bend; pt states he heard it pop 3 different times       HPI  Patient presents to express care today complaining of swelling pain right middle finger with inability to extend the distal portion. He states he was on all fours putting pressure down on his hands when he felt something pop. States that he cannot straighten the finger out. Denies any numbness or tingling. Review of Systems   Musculoskeletal:        See above   Neurological: Negative for weakness and numbness. REST OF PERTINENT ROS GONE OVER AND WAS NEGATIVE.                Current Outpatient Medications:     lisinopril (PRINIVIL;ZESTRIL) 20 MG tablet, Take 1 tablet by mouth daily, Disp: 90 tablet, Rfl: 2    simvastatin (ZOCOR) 20 MG tablet, Take 1 tablet by mouth daily, Disp: 90 tablet, Rfl: 2    omeprazole (PRILOSEC) 20 MG delayed release capsule, Take 1 capsule by mouth daily, Disp: 90 capsule, Rfl: 2    fluticasone (FLONASE) 50 MCG/ACT nasal spray, 2 sprays by Each Nostril route daily, Disp: 1 Bottle, Rfl: 6  No Known Allergies    Past Medical History:   Diagnosis Date    Heartburn     Hyperlipidemia     Hypertension     Polyp of colon     Smokers' cough ([facility] [address]      Past Surgical History:   Procedure Laterality Date    APPENDECTOMY      COLONOSCOPY      COLONOSCOPY N/A 3/16/2020    COLORECTAL CANCER SCREENING, NOT HIGH RISK performed by Molina Jansen MD at [address] Left     SIDE OF FACE     Family History   Problem Relation Age of Onset    Breast Cancer Mother     Cancer Mother     High Blood Pressure Father     Heart Disease Father     Heart Attack Father     Prostate Cancer Father     Prostate Cancer Sister     Other Sister      Social History No redness or warmth. Flexion is good. Neurological:      Mental Status: He is alert and oriented to person, place, and time. Sensory: No sensory deficit. Psychiatric:         Mood and Affect: Mood normal.         Behavior: Behavior normal.         Thought Content: Thought content normal.         Judgment: Judgment normal.               Assessment and Plan:  Keith Wilkins was seen today for hand pain. Diagnoses and all orders for this visit:    Right hand pain  -     XR HAND RIGHT (MIN 3 VIEWS); Juan Pablo Brown MD, Orthopaedics (hand & upper extremities), [place]    Mallet finger of right hand  -     XR HAND RIGHT (MIN 3 VIEWS); Juan Pablo Brown MD, Orthopaedics (hand & upper extremities), [place]    Essential hypertension    Plan: Patient with mallet finger distal portion right third finger x-ray was obtained which I visualize showing no acute process. Splinted the finger and referred him to orthopedics. Follow-up with PCP. Notify us of problems in the interim. Return for referral .    Seen By:  Rafael Stubbs MD      *Document was created using voice recognition software. Note was reviewed however may contain grammatical errors.

## 2020-10-10 ENCOUNTER — TELEPHONE (OUTPATIENT)
Dept: FAMILY MEDICINE CLINIC | Age: 60
End: 2020-10-10

## 2020-11-09 ENCOUNTER — TELEPHONE (OUTPATIENT)
Dept: ORTHOPEDIC SURGERY | Age: 60
End: 2020-11-09

## 2020-11-10 ENCOUNTER — OFFICE VISIT (OUTPATIENT)
Dept: ORTHOPEDIC SURGERY | Age: 60
End: 2020-11-10
Payer: COMMERCIAL

## 2020-11-10 VITALS — TEMPERATURE: 98.8 F | HEIGHT: 66 IN | WEIGHT: 135 LBS | RESPIRATION RATE: 18 BRPM | BODY MASS INDEX: 21.69 KG/M2

## 2020-11-10 PROCEDURE — 4004F PT TOBACCO SCREEN RCVD TLK: CPT | Performed by: ORTHOPAEDIC SURGERY

## 2020-11-10 PROCEDURE — 99202 OFFICE O/P NEW SF 15 MIN: CPT | Performed by: ORTHOPAEDIC SURGERY

## 2020-11-10 PROCEDURE — G8484 FLU IMMUNIZE NO ADMIN: HCPCS | Performed by: ORTHOPAEDIC SURGERY

## 2020-11-10 PROCEDURE — 3017F COLORECTAL CA SCREEN DOC REV: CPT | Performed by: ORTHOPAEDIC SURGERY

## 2020-11-10 PROCEDURE — G8427 DOCREV CUR MEDS BY ELIG CLIN: HCPCS | Performed by: ORTHOPAEDIC SURGERY

## 2020-11-10 PROCEDURE — G8420 CALC BMI NORM PARAMETERS: HCPCS | Performed by: ORTHOPAEDIC SURGERY

## 2020-11-10 NOTE — PROGRESS NOTES
DEPARTMENT OF HAND SURGERY   CONSULT NOTE    Chief Complaint   Patient presents with    Finger Injury     Right middle mallet finger       Aleena Giron is a 63y.o. year old  male who presents for evaluation of right hand pain. he reports this started 4 weeks ago. He injured it while during acts of intimacy. He felt a pop in his right hand middle finger. He had xrays which demonstrated no fracture or dislocation. He had a soft tissue mallet finger. He has been in a splint for 4 weeks. He denies numbness or tingling. He is LHD and works as a .       Past Medical History:   Diagnosis Date    Heartburn     Hyperlipidemia     Hypertension     Polyp of colon     Smokers' cough ([address]      Past Surgical History:   Procedure Laterality Date    APPENDECTOMY      COLONOSCOPY      COLONOSCOPY N/A 3/16/2020    COLORECTAL CANCER SCREENING, NOT HIGH RISK performed by Stacie Swanson MD at [address] Left     SIDE OF FACE       Current Outpatient Medications:     lisinopril (PRINIVIL;ZESTRIL) 20 MG tablet, Take 1 tablet by mouth daily, Disp: 90 tablet, Rfl: 2    simvastatin (ZOCOR) 20 MG tablet, Take 1 tablet by mouth daily, Disp: 90 tablet, Rfl: 2    omeprazole (PRILOSEC) 20 MG delayed release capsule, Take 1 capsule by mouth daily, Disp: 90 capsule, Rfl: 2    fluticasone (FLONASE) 50 MCG/ACT nasal spray, 2 sprays by Each Nostril route daily, Disp: 1 Bottle, Rfl: 6  No Known Allergies  Social History     Socioeconomic History    Marital status: Single     Spouse name: Not on file    Number of children: Not on file    Years of education: Not on file    Highest education level: Not on file   Occupational History    Not on file   Social Needs    Financial resource strain: Not on file    Food insecurity     Worry: Not on file     Inability: Not on file    Transportation needs     Medical: Not on file     Non-medical: Not on file   Tobacco Use    Smoking status: Current Every Day Smoker     Packs/day: 1.00     Years: 48.00     Pack years: 48.00     Types: Cigarettes    Smokeless tobacco: Never Used   Substance and Sexual Activity    Alcohol use: Never     Frequency: Never    Drug use: Never    Sexual activity: Not on file   Lifestyle    Physical activity     Days per week: Not on file     Minutes per session: Not on file    Stress: Not on file   Relationships    Social connections     Talks on phone: Not on file     Gets together: Not on file     Attends Roman Catholic service: Not on file     Active member of club or organization: Not on file     Attends meetings of clubs or organizations: Not on file     Relationship status: Not on file    Intimate partner violence     Fear of current or ex partner: Not on file     Emotionally abused: Not on file     Physically abused: Not on file     Forced sexual activity: Not on file   Other Topics Concern    Not on file   Social History Narrative    Not on file     Family History   Problem Relation Age of Onset    Breast Cancer Mother     Cancer Mother     High Blood Pressure Father     Heart Disease Father     Heart Attack Father     Prostate Cancer Father     Prostate Cancer Sister     Other Sister        REVIEW OF SYSTEMS:     General/Constitution:  (-)weight loss, (-)fever, (-)chills, (-)weakness. Skin: (-) rash,(-) psoriasis,(-) eczema, (-)skin cancer. Musculoskeletal: Tenderness to right distal middle finger,  (-) dislocations,(-) collagen vascular disease, (-) fibromyalgia, (-) multiple sclerosis, (-) muscular dystrophy, (-) RSD,(-) joint pain (-)swelling, (-) joint pain,swelling. Neurologic: (-) epilepsy, (-)seizures,(-) brain tumor,(-) TIA, (-)stroke, (-)headaches, (-)Parkinson disease,(-) memory loss, (-) LOC. Cardiovascular: (-) Chest pain, (-) swelling in legs/feet, (-) SOB, (-) cramping in legs/feet with walking.   Respiratory: (-) SOB, (-) Coughing, (-) night sweats. GI: (-) nausea, (-) vomiting, (-) diarrhea, (-) blood in stool, (-) gastric ulcer. Psychiatric: (-) Depression, (-) Anxiety, (-) bipolar disease, (-) Alzheimer's Disease  Allergic/Immunologic: (-) allergies latex, (-) allergies metal, (-) skin sensitivity. Hematlogic: (-) anemia, (-) blood transfusion, (-) DVT/PE, (-) Clotting disorders      SUBJECTIVE:    Psycihatric:    The patient is alert and oriented x 3, appears to be stated age and in no distress. Respiratory:    Respiratory effort is not labored. Patient is not gasping. Palpation of the chest reveals no tactile fremitus. Skin:    Upon inspection: the skin appears warm, dry and intact. There is not a previous scar over the affected area. There is not any cellulitis, lymphedema or cutaneous lesions. Upon palpation there is no induration noted. Musculoskeletal:  Gait: normal; examination of the nails and digits reveal no cyanosis or clubbing. Hand exam:    The skin overlying the hand is  intact. There is not evidence of scar, lesion, laceration, or abrasion. Right: Middle Finger  Tenderness to dorsal distal phalanx. There is a 15 degree extension lag to DIP  Non tender to PIP,MCP joints,  Non tender to rest of hand and wrist   Skin intact circumferentially  +AIN/PIN/Ulnar nerve function intact grossly  +Radial pulse, Brisk Cap refill, hand warm and perfused  Sensation intact to touch in radial/ulnar/median nerve distributions to hand        Xrays: XR right Hand: 3 views, AP, Lateral, Oblique demonstrating no fracture or dislocation  Impression: no fracture or dislocations    Radiographic findings reviewed with patient    Impression: No diagnosis found. Right middle finger soft tissue mallet injury with 15 degree extensor lag. Plan: Natural history and expected course discussed. Questions answered.    Recommend continue extension DIP splinting for 24 hours a day for the next 6 weeks   Follow up in 6 weeks, If doing well he can cancel his appointment  Discuss and seen with Dr. Bernard Allen       I have seen and evaluated the patient and agree with the above assessment and plan on today's visit. I have performed the key components of the history and physical examination with significant findings of right middle finger mallet improving with splinting. Continue splinting. I concur with the findings and plan as documented.     Usha Laws MD  11/10/2020

## 2023-07-21 ENCOUNTER — HOSPITAL ENCOUNTER (OUTPATIENT)
Age: 63
Discharge: HOME | End: 2023-07-23

## 2023-07-21 PROCEDURE — 88305 TISSUE EXAM BY PATHOLOGIST: CPT

## 2023-07-26 LAB — SURGICAL PATHOLOGY REPORT: NORMAL

## 2025-05-06 ENCOUNTER — APPOINTMENT (OUTPATIENT)
Dept: MRI IMAGING | Age: 65
End: 2025-05-06
Payer: MEDICARE

## 2025-05-06 ENCOUNTER — APPOINTMENT (OUTPATIENT)
Dept: CT IMAGING | Age: 65
End: 2025-05-06
Payer: MEDICARE

## 2025-05-06 ENCOUNTER — HOSPITAL ENCOUNTER (INPATIENT)
Age: 65
LOS: 7 days | Discharge: NURSING, REHAB OR LONG-TERM CARE | DRG: 065 | End: 2025-05-14
Attending: INTERNAL MEDICINE | Admitting: INTERNAL MEDICINE
Payer: MEDICARE

## 2025-05-06 ENCOUNTER — HOSPITAL ENCOUNTER (EMERGENCY)
Age: 65
Discharge: ANOTHER HOSPITAL | End: 2025-05-06
Attending: EMERGENCY MEDICINE
Payer: MEDICARE

## 2025-05-06 ENCOUNTER — APPOINTMENT (OUTPATIENT)
Dept: GENERAL RADIOLOGY | Age: 65
End: 2025-05-06
Payer: MEDICARE

## 2025-05-06 VITALS
HEART RATE: 86 BPM | WEIGHT: 136 LBS | BODY MASS INDEX: 21.86 KG/M2 | RESPIRATION RATE: 17 BRPM | OXYGEN SATURATION: 98 % | HEIGHT: 66 IN | SYSTOLIC BLOOD PRESSURE: 149 MMHG | DIASTOLIC BLOOD PRESSURE: 76 MMHG | TEMPERATURE: 97.9 F

## 2025-05-06 DIAGNOSIS — I63.9 CEREBROVASCULAR ACCIDENT (CVA), UNSPECIFIED MECHANISM (HCC): Primary | ICD-10-CM

## 2025-05-06 PROBLEM — R29.90 STROKE-LIKE SYMPTOMS: Status: ACTIVE | Noted: 2025-05-06

## 2025-05-06 LAB
ALBUMIN SERPL-MCNC: 4.5 G/DL (ref 3.5–5.2)
ALP SERPL-CCNC: 85 U/L (ref 40–129)
ALT SERPL-CCNC: 32 U/L (ref 0–40)
ANION GAP SERPL CALCULATED.3IONS-SCNC: 14 MMOL/L (ref 7–16)
AST SERPL-CCNC: 27 U/L (ref 0–39)
BASOPHILS # BLD: 0.07 K/UL (ref 0–0.2)
BASOPHILS NFR BLD: 1 % (ref 0–2)
BILIRUB SERPL-MCNC: 0.6 MG/DL (ref 0–1.2)
BUN SERPL-MCNC: 14 MG/DL (ref 6–23)
CALCIUM SERPL-MCNC: 9.8 MG/DL (ref 8.6–10.2)
CHLORIDE SERPL-SCNC: 101 MMOL/L (ref 98–107)
CHP ED QC CHECK: NORMAL
CO2 SERPL-SCNC: 23 MMOL/L (ref 22–29)
CREAT SERPL-MCNC: 1 MG/DL (ref 0.7–1.2)
EKG ATRIAL RATE: 89 BPM
EKG P AXIS: 89 DEGREES
EKG P-R INTERVAL: 154 MS
EKG Q-T INTERVAL: 356 MS
EKG QRS DURATION: 72 MS
EKG QTC CALCULATION (BAZETT): 433 MS
EKG R AXIS: 44 DEGREES
EKG T AXIS: -8 DEGREES
EKG VENTRICULAR RATE: 89 BPM
EOSINOPHIL # BLD: 0.42 K/UL (ref 0.05–0.5)
EOSINOPHILS RELATIVE PERCENT: 6 % (ref 0–6)
ERYTHROCYTE [DISTWIDTH] IN BLOOD BY AUTOMATED COUNT: 13.2 % (ref 11.5–15)
GFR, ESTIMATED: 89 ML/MIN/1.73M2
GLUCOSE BLD-MCNC: 113 MG/DL
GLUCOSE BLD-MCNC: 113 MG/DL (ref 74–99)
GLUCOSE SERPL-MCNC: 109 MG/DL (ref 74–99)
HCT VFR BLD AUTO: 50.3 % (ref 37–54)
HGB BLD-MCNC: 17.3 G/DL (ref 12.5–16.5)
IMM GRANULOCYTES # BLD AUTO: 0.04 K/UL (ref 0–0.58)
IMM GRANULOCYTES NFR BLD: 1 % (ref 0–5)
INR PPP: 1.1
LYMPHOCYTES NFR BLD: 1.8 K/UL (ref 1.5–4)
LYMPHOCYTES RELATIVE PERCENT: 25 % (ref 20–42)
MCH RBC QN AUTO: 30.4 PG (ref 26–35)
MCHC RBC AUTO-ENTMCNC: 34.4 G/DL (ref 32–34.5)
MCV RBC AUTO: 88.4 FL (ref 80–99.9)
MONOCYTES NFR BLD: 0.96 K/UL (ref 0.1–0.95)
MONOCYTES NFR BLD: 13 % (ref 2–12)
NEUTROPHILS NFR BLD: 55 % (ref 43–80)
NEUTS SEG NFR BLD: 4.06 K/UL (ref 1.8–7.3)
PLATELET # BLD AUTO: 228 K/UL (ref 130–450)
PMV BLD AUTO: 10.9 FL (ref 7–12)
POTASSIUM SERPL-SCNC: 4.1 MMOL/L (ref 3.5–5)
PROT SERPL-MCNC: 7.7 G/DL (ref 6.4–8.3)
PROTHROMBIN TIME: 12 SEC (ref 9.3–12.4)
RBC # BLD AUTO: 5.69 M/UL (ref 3.8–5.8)
SODIUM SERPL-SCNC: 138 MMOL/L (ref 132–146)
TROPONIN I SERPL HS-MCNC: 6 NG/L (ref 0–22)
WBC OTHER # BLD: 7.4 K/UL (ref 4.5–11.5)

## 2025-05-06 PROCEDURE — 70496 CT ANGIOGRAPHY HEAD: CPT

## 2025-05-06 PROCEDURE — G0378 HOSPITAL OBSERVATION PER HR: HCPCS

## 2025-05-06 PROCEDURE — 6370000000 HC RX 637 (ALT 250 FOR IP)

## 2025-05-06 PROCEDURE — 82962 GLUCOSE BLOOD TEST: CPT

## 2025-05-06 PROCEDURE — 71045 X-RAY EXAM CHEST 1 VIEW: CPT

## 2025-05-06 PROCEDURE — 0042T CT BRAIN PERFUSION: CPT

## 2025-05-06 PROCEDURE — 70450 CT HEAD/BRAIN W/O DYE: CPT

## 2025-05-06 PROCEDURE — 93010 ELECTROCARDIOGRAM REPORT: CPT | Performed by: INTERNAL MEDICINE

## 2025-05-06 PROCEDURE — G0379 DIRECT REFER HOSPITAL OBSERV: HCPCS

## 2025-05-06 PROCEDURE — 93005 ELECTROCARDIOGRAM TRACING: CPT

## 2025-05-06 PROCEDURE — 84484 ASSAY OF TROPONIN QUANT: CPT

## 2025-05-06 PROCEDURE — 99285 EMERGENCY DEPT VISIT HI MDM: CPT

## 2025-05-06 PROCEDURE — 85610 PROTHROMBIN TIME: CPT

## 2025-05-06 PROCEDURE — 85025 COMPLETE CBC W/AUTO DIFF WBC: CPT

## 2025-05-06 PROCEDURE — 72125 CT NECK SPINE W/O DYE: CPT

## 2025-05-06 PROCEDURE — 2500000003 HC RX 250 WO HCPCS: Performed by: NURSE PRACTITIONER

## 2025-05-06 PROCEDURE — 6360000002 HC RX W HCPCS: Performed by: NURSE PRACTITIONER

## 2025-05-06 PROCEDURE — 80053 COMPREHEN METABOLIC PANEL: CPT

## 2025-05-06 PROCEDURE — 70551 MRI BRAIN STEM W/O DYE: CPT

## 2025-05-06 RX ORDER — CLOPIDOGREL BISULFATE 75 MG/1
300 TABLET ORAL ONCE
Status: COMPLETED | OUTPATIENT
Start: 2025-05-06 | End: 2025-05-06

## 2025-05-06 RX ORDER — POLYETHYLENE GLYCOL 3350 17 G/17G
17 POWDER, FOR SOLUTION ORAL DAILY PRN
Status: DISCONTINUED | OUTPATIENT
Start: 2025-05-06 | End: 2025-05-14 | Stop reason: HOSPADM

## 2025-05-06 RX ORDER — SODIUM CHLORIDE 9 MG/ML
INJECTION, SOLUTION INTRAVENOUS PRN
Status: CANCELLED | OUTPATIENT
Start: 2025-05-06

## 2025-05-06 RX ORDER — LISINOPRIL 20 MG/1
20 TABLET ORAL DAILY
Status: DISCONTINUED | OUTPATIENT
Start: 2025-05-06 | End: 2025-05-14 | Stop reason: HOSPADM

## 2025-05-06 RX ORDER — SODIUM CHLORIDE 0.9 % (FLUSH) 0.9 %
5-40 SYRINGE (ML) INJECTION PRN
Status: CANCELLED | OUTPATIENT
Start: 2025-05-06

## 2025-05-06 RX ORDER — SODIUM CHLORIDE 0.9 % (FLUSH) 0.9 %
5-40 SYRINGE (ML) INJECTION EVERY 12 HOURS SCHEDULED
Status: CANCELLED | OUTPATIENT
Start: 2025-05-06

## 2025-05-06 RX ORDER — ASPIRIN 81 MG/1
81 TABLET, CHEWABLE ORAL DAILY
Status: DISCONTINUED | OUTPATIENT
Start: 2025-05-06 | End: 2025-05-14 | Stop reason: HOSPADM

## 2025-05-06 RX ORDER — SODIUM CHLORIDE 0.9 % (FLUSH) 0.9 %
5-40 SYRINGE (ML) INJECTION PRN
Status: DISCONTINUED | OUTPATIENT
Start: 2025-05-06 | End: 2025-05-14 | Stop reason: HOSPADM

## 2025-05-06 RX ORDER — SODIUM CHLORIDE 9 MG/ML
INJECTION, SOLUTION INTRAVENOUS PRN
Status: DISCONTINUED | OUTPATIENT
Start: 2025-05-06 | End: 2025-05-14 | Stop reason: HOSPADM

## 2025-05-06 RX ORDER — LISINOPRIL 10 MG/1
20 TABLET ORAL DAILY
Status: CANCELLED | OUTPATIENT
Start: 2025-05-06

## 2025-05-06 RX ORDER — ONDANSETRON 2 MG/ML
4 INJECTION INTRAMUSCULAR; INTRAVENOUS EVERY 6 HOURS PRN
Status: CANCELLED | OUTPATIENT
Start: 2025-05-06

## 2025-05-06 RX ORDER — ONDANSETRON 2 MG/ML
4 INJECTION INTRAMUSCULAR; INTRAVENOUS EVERY 6 HOURS PRN
Status: DISCONTINUED | OUTPATIENT
Start: 2025-05-06 | End: 2025-05-14 | Stop reason: HOSPADM

## 2025-05-06 RX ORDER — ONDANSETRON 4 MG/1
4 TABLET, ORALLY DISINTEGRATING ORAL EVERY 8 HOURS PRN
Status: CANCELLED | OUTPATIENT
Start: 2025-05-06

## 2025-05-06 RX ORDER — POLYETHYLENE GLYCOL 3350 17 G/17G
17 POWDER, FOR SOLUTION ORAL DAILY PRN
Status: CANCELLED | OUTPATIENT
Start: 2025-05-06

## 2025-05-06 RX ORDER — ENOXAPARIN SODIUM 100 MG/ML
40 INJECTION SUBCUTANEOUS DAILY
Status: DISCONTINUED | OUTPATIENT
Start: 2025-05-06 | End: 2025-05-07

## 2025-05-06 RX ORDER — ATORVASTATIN CALCIUM 10 MG/1
10 TABLET, FILM COATED ORAL DAILY
Status: DISCONTINUED | OUTPATIENT
Start: 2025-05-06 | End: 2025-05-07

## 2025-05-06 RX ORDER — ENOXAPARIN SODIUM 100 MG/ML
40 INJECTION SUBCUTANEOUS DAILY
Status: CANCELLED | OUTPATIENT
Start: 2025-05-06

## 2025-05-06 RX ORDER — SODIUM CHLORIDE 0.9 % (FLUSH) 0.9 %
5-40 SYRINGE (ML) INJECTION EVERY 12 HOURS SCHEDULED
Status: DISCONTINUED | OUTPATIENT
Start: 2025-05-06 | End: 2025-05-14 | Stop reason: HOSPADM

## 2025-05-06 RX ORDER — ASPIRIN 81 MG/1
324 TABLET, CHEWABLE ORAL ONCE
Status: COMPLETED | OUTPATIENT
Start: 2025-05-06 | End: 2025-05-06

## 2025-05-06 RX ORDER — ASPIRIN 300 MG/1
300 SUPPOSITORY RECTAL DAILY
Status: DISCONTINUED | OUTPATIENT
Start: 2025-05-06 | End: 2025-05-14 | Stop reason: HOSPADM

## 2025-05-06 RX ORDER — ATORVASTATIN CALCIUM 20 MG/1
10 TABLET, FILM COATED ORAL DAILY
Status: CANCELLED | OUTPATIENT
Start: 2025-05-06

## 2025-05-06 RX ORDER — ASPIRIN 300 MG/1
300 SUPPOSITORY RECTAL DAILY
Status: CANCELLED | OUTPATIENT
Start: 2025-05-06

## 2025-05-06 RX ORDER — ASPIRIN 81 MG/1
81 TABLET, CHEWABLE ORAL DAILY
Status: CANCELLED | OUTPATIENT
Start: 2025-05-06

## 2025-05-06 RX ORDER — IOPAMIDOL 755 MG/ML
110 INJECTION, SOLUTION INTRAVASCULAR
Status: DISCONTINUED | OUTPATIENT
Start: 2025-05-06 | End: 2025-05-06 | Stop reason: HOSPADM

## 2025-05-06 RX ORDER — ONDANSETRON 4 MG/1
4 TABLET, ORALLY DISINTEGRATING ORAL EVERY 8 HOURS PRN
Status: DISCONTINUED | OUTPATIENT
Start: 2025-05-06 | End: 2025-05-14 | Stop reason: HOSPADM

## 2025-05-06 RX ADMIN — ENOXAPARIN SODIUM 40 MG: 100 INJECTION SUBCUTANEOUS at 18:55

## 2025-05-06 RX ADMIN — ASPIRIN 324 MG: 81 TABLET, CHEWABLE ORAL at 09:10

## 2025-05-06 RX ADMIN — CLOPIDOGREL BISULFATE 300 MG: 75 TABLET, FILM COATED ORAL at 09:10

## 2025-05-06 RX ADMIN — SODIUM CHLORIDE, PRESERVATIVE FREE 10 ML: 5 INJECTION INTRAVENOUS at 20:09

## 2025-05-06 ASSESSMENT — PAIN - FUNCTIONAL ASSESSMENT: PAIN_FUNCTIONAL_ASSESSMENT: NONE - DENIES PAIN

## 2025-05-06 ASSESSMENT — LIFESTYLE VARIABLES
HOW MANY STANDARD DRINKS CONTAINING ALCOHOL DO YOU HAVE ON A TYPICAL DAY: PATIENT DOES NOT DRINK
HOW OFTEN DO YOU HAVE A DRINK CONTAINING ALCOHOL: NEVER

## 2025-05-06 NOTE — PLAN OF CARE
Problem: Discharge Planning  Goal: Discharge to home or other facility with appropriate resources  Outcome: Progressing     Problem: Skin/Tissue Integrity  Goal: Skin integrity remains intact  Description: 1.  Monitor for areas of redness and/or skin breakdown2.  Assess vascular access sites hourly3.  Every 4-6 hours minimum:  Change oxygen saturation probe site4.  Every 4-6 hours:  If on nasal continuous positive airway pressure, respiratory therapy assess nares and determine need for appliance change or resting period  Outcome: Progressing     Problem: Safety - Adult  Goal: Free from fall injury  Outcome: Progressing     Problem: ABCDS Injury Assessment  Goal: Absence of physical injury  Outcome: Progressing

## 2025-05-06 NOTE — ED NOTES
Report given to lourdes- reviewed chart and treatment plan while in ed- called [facility] 6501 [phone]  PAS ETA 1630  Gave family room assignment and time for pas pickup

## 2025-05-06 NOTE — DISCHARGE INSTR - COC
Continuity of Care Form    Patient Name: Esteban Terry   :  1960  MRN:  [id number]    Admit date:  2025  Discharge date:  ***    Code Status Order: Prior   Advance Directives:     Admitting Physician:  No admitting provider for patient encounter.  PCP: Carl Monroy MD    Discharging Nurse: ***  Discharging Hospital Unit/Room#:   Discharging Unit Phone Number: ***    Emergency Contact:   Extended Emergency Contact Information  Primary Emergency Contact: Rosa Isela Celis  Address: [address] [place] [address]  Home Phone: [phone]  Mobile Phone: [phone]  Relation: Other    Past Surgical History:  Past Surgical History:   Procedure Laterality Date    APPENDECTOMY      COLONOSCOPY      COLONOSCOPY N/A 3/16/2020    COLORECTAL CANCER SCREENING, NOT HIGH RISK performed by Niall Calvert III, MD at [facility] ENDOSCOPY    HERNIA REPAIR      SKIN CANCER EXCISION Left     SIDE OF FACE       Immunization History:     There is no immunization history on file for this patient.    Active Problems:  Patient Active Problem List   Diagnosis Code    Gastroesophageal reflux disease without esophagitis K21.9       Isolation/Infection:   Isolation            No Isolation          Patient Infection Status    None to display         Nurse Assessment:  Last Vital Signs: BP (!) 149/76   Pulse 86   Temp 97.9 °F (36.6 °C)   Resp 17   Ht 1.676 m (5' 6\")   Wt 61.7 kg (136 lb)   SpO2 98%   BMI 21.95 kg/m²     Last documented pain score (0-10 scale):    Last Weight:   Wt Readings from Last 1 Encounters:   25 61.7 kg (136 lb)     Mental Status:  {IP PT MENTAL STATUS:}    IV Access:  { ULISES IV ACCESS:079338390}    Nursing Mobility/ADLs:  Walking   {CHP DME ADLs:448993766}  Transfer  {CHP DME ADLs:889459158}  Bathing  {CHP DME ADLs:470180765}  Dressing  {CHP DME ADLs:625226139}  Toileting  {CHP DME ADLs:060184529}  Feeding  {CHP DME ADLs:337233399}  Med Admin

## 2025-05-06 NOTE — ED PROVIDER NOTES
CTA HEAD W CONTRAST   Preliminary Result   1. No evidence of intracranial arterial stenosis or occlusion.   2. Evidence of hypodense, chronic appearing focal infarction at the anterior   aspect of left lentiform nucleus, anterior limb of the left internal capsule   and head and part of body of the left caudate nucleus.   3. Evidence of hypodense, chronic appearing focal infarction involving the   anterior aspect of head of right caudate nucleus, anterior limb of the right   internal capsule, anterior aspect of right lentiform nucleus and anterior   aspect of body of the right caudate nucleus.   4. Evidence of lacunar infarctions, likely to be chronic or at least subacute   in the left thalamus.   5. In the left subcortical area, in left supra sylvian location there are   ill-defined hypodensities, suggestive of focal infarctions, which may be   acute or subacute. Follow-up evaluation with MRI of brain, recommended.         CT HEAD WO CONTRAST   Final Result   1. No acute intracranial abnormality.   2. Multiple low-attenuation areas identified within the basal ganglia   bilaterally with extension to the periventricular white matter to suggest   chronic lacunar insults.  Consider MRI for further evaluation if clinical   symptoms persist.         XR CHEST PORTABLE   Final Result   Right upper lobe irregular nodular opacity.  Recommend CT chest to evaluate   for underlying neoplasm.      RECOMMENDATION:   CT chest.           No results found.    No results found.    PROCEDURES   Unless otherwise noted below, none       EMERGENCY DEPARTMENT COURSE    Vitals:    Vitals:    05/06/25 0715 05/06/25 0930   BP: (!) 155/70 (!) 149/76   Pulse: 87 86   Resp: 16 17   Temp: 97.9 °F (36.6 °C)    SpO2: 98% 98%   Weight: 61.7 kg (136 lb)    Height: 1.676 m (5' 6\")        Patient was given the following medications:  Medications   iopamidol (ISOVUE-370) 76 % injection 110 mL (has no administration in time range)   aspirin chewable

## 2025-05-07 PROBLEM — R73.03 PRE-DIABETES: Status: ACTIVE | Noted: 2025-05-07

## 2025-05-07 PROBLEM — E78.5 HLD (HYPERLIPIDEMIA): Status: ACTIVE | Noted: 2025-05-07

## 2025-05-07 LAB
ALBUMIN SERPL-MCNC: 4.2 G/DL (ref 3.5–5.2)
ALP SERPL-CCNC: 80 U/L (ref 40–129)
ALT SERPL-CCNC: 24 U/L (ref 0–50)
ANION GAP SERPL CALCULATED.3IONS-SCNC: 14 MMOL/L (ref 7–16)
ANION GAP SERPL CALCULATED.3IONS-SCNC: 15 MMOL/L (ref 7–16)
AST SERPL-CCNC: 25 U/L (ref 0–50)
BASOPHILS # BLD: 0.1 K/UL (ref 0–0.2)
BASOPHILS NFR BLD: 1 % (ref 0–2)
BILIRUB SERPL-MCNC: 0.5 MG/DL (ref 0–1.2)
BUN SERPL-MCNC: 16 MG/DL (ref 8–23)
BUN SERPL-MCNC: 21 MG/DL (ref 8–23)
CALCIUM SERPL-MCNC: 9.1 MG/DL (ref 8.8–10.2)
CALCIUM SERPL-MCNC: 9.2 MG/DL (ref 8.8–10.2)
CHLORIDE SERPL-SCNC: 102 MMOL/L (ref 98–107)
CHLORIDE SERPL-SCNC: 103 MMOL/L (ref 98–107)
CHOLEST SERPL-MCNC: 257 MG/DL
CO2 SERPL-SCNC: 19 MMOL/L (ref 22–29)
CO2 SERPL-SCNC: 20 MMOL/L (ref 22–29)
CREAT SERPL-MCNC: 0.8 MG/DL (ref 0.7–1.2)
CREAT SERPL-MCNC: 0.8 MG/DL (ref 0.7–1.2)
EOSINOPHIL # BLD: 0.31 K/UL (ref 0.05–0.5)
EOSINOPHILS RELATIVE PERCENT: 4 % (ref 0–6)
ERYTHROCYTE [DISTWIDTH] IN BLOOD BY AUTOMATED COUNT: 12.9 % (ref 11.5–15)
ERYTHROCYTE [DISTWIDTH] IN BLOOD BY AUTOMATED COUNT: 13 % (ref 11.5–15)
GFR, ESTIMATED: >90 ML/MIN/1.73M2
GFR, ESTIMATED: >90 ML/MIN/1.73M2
GLUCOSE BLD-MCNC: 108 MG/DL (ref 74–99)
GLUCOSE BLD-MCNC: 89 MG/DL (ref 74–99)
GLUCOSE SERPL-MCNC: 106 MG/DL (ref 74–99)
GLUCOSE SERPL-MCNC: 183 MG/DL (ref 74–99)
HBA1C MFR BLD: 6.1 % (ref 4–5.6)
HCT VFR BLD AUTO: 48.3 % (ref 37–54)
HCT VFR BLD AUTO: 50.4 % (ref 37–54)
HDLC SERPL-MCNC: 29 MG/DL
HGB BLD-MCNC: 17.2 G/DL (ref 12.5–16.5)
HGB BLD-MCNC: 17.9 G/DL (ref 12.5–16.5)
IMM GRANULOCYTES # BLD AUTO: 0.03 K/UL (ref 0–0.58)
IMM GRANULOCYTES NFR BLD: 0 % (ref 0–5)
LACTATE BLDV-SCNC: 0.8 MMOL/L (ref 0.5–2.2)
LDLC SERPL CALC-MCNC: 198 MG/DL
LYMPHOCYTES NFR BLD: 1.89 K/UL (ref 1.5–4)
LYMPHOCYTES RELATIVE PERCENT: 24 % (ref 20–42)
MAGNESIUM SERPL-MCNC: 2.2 MG/DL (ref 1.6–2.4)
MCH RBC QN AUTO: 30.7 PG (ref 26–35)
MCH RBC QN AUTO: 30.9 PG (ref 26–35)
MCHC RBC AUTO-ENTMCNC: 35.5 G/DL (ref 32–34.5)
MCHC RBC AUTO-ENTMCNC: 35.6 G/DL (ref 32–34.5)
MCV RBC AUTO: 86.1 FL (ref 80–99.9)
MCV RBC AUTO: 87 FL (ref 80–99.9)
MONOCYTES NFR BLD: 0.79 K/UL (ref 0.1–0.95)
MONOCYTES NFR BLD: 10 % (ref 2–12)
NEUTROPHILS NFR BLD: 61 % (ref 43–80)
NEUTS SEG NFR BLD: 4.8 K/UL (ref 1.8–7.3)
PARTIAL THROMBOPLASTIN TIME: 36.6 SEC (ref 24.5–35.1)
PHOSPHATE SERPL-MCNC: 2.5 MG/DL (ref 2.5–4.5)
PLATELET # BLD AUTO: 207 K/UL (ref 130–450)
PLATELET # BLD AUTO: 225 K/UL (ref 130–450)
PMV BLD AUTO: 10.7 FL (ref 7–12)
PMV BLD AUTO: 10.8 FL (ref 7–12)
POTASSIUM SERPL-SCNC: 3.6 MMOL/L (ref 3.5–5.1)
POTASSIUM SERPL-SCNC: 3.8 MMOL/L (ref 3.5–5.1)
PROT SERPL-MCNC: 7.2 G/DL (ref 6.4–8.3)
RBC # BLD AUTO: 5.61 M/UL (ref 3.8–5.8)
RBC # BLD AUTO: 5.79 M/UL (ref 3.8–5.8)
SODIUM SERPL-SCNC: 136 MMOL/L (ref 136–145)
SODIUM SERPL-SCNC: 137 MMOL/L (ref 136–145)
TRIGL SERPL-MCNC: 151 MG/DL
TROPONIN I SERPL HS-MCNC: 8 NG/L (ref 0–22)
VLDLC SERPL CALC-MCNC: 30 MG/DL
WBC OTHER # BLD: 7 K/UL (ref 4.5–11.5)
WBC OTHER # BLD: 7.9 K/UL (ref 4.5–11.5)

## 2025-05-07 PROCEDURE — 80053 COMPREHEN METABOLIC PANEL: CPT

## 2025-05-07 PROCEDURE — 84100 ASSAY OF PHOSPHORUS: CPT

## 2025-05-07 PROCEDURE — 36415 COLL VENOUS BLD VENIPUNCTURE: CPT

## 2025-05-07 PROCEDURE — 92610 EVALUATE SWALLOWING FUNCTION: CPT

## 2025-05-07 PROCEDURE — 6370000000 HC RX 637 (ALT 250 FOR IP): Performed by: NURSE PRACTITIONER

## 2025-05-07 PROCEDURE — 97161 PT EVAL LOW COMPLEX 20 MIN: CPT

## 2025-05-07 PROCEDURE — 82962 GLUCOSE BLOOD TEST: CPT

## 2025-05-07 PROCEDURE — 97165 OT EVAL LOW COMPLEX 30 MIN: CPT

## 2025-05-07 PROCEDURE — 2500000003 HC RX 250 WO HCPCS

## 2025-05-07 PROCEDURE — 6360000002 HC RX W HCPCS: Performed by: NURSE PRACTITIONER

## 2025-05-07 PROCEDURE — 83605 ASSAY OF LACTIC ACID: CPT

## 2025-05-07 PROCEDURE — 93005 ELECTROCARDIOGRAM TRACING: CPT | Performed by: INTERNAL MEDICINE

## 2025-05-07 PROCEDURE — 97530 THERAPEUTIC ACTIVITIES: CPT

## 2025-05-07 PROCEDURE — 97535 SELF CARE MNGMENT TRAINING: CPT

## 2025-05-07 PROCEDURE — 85027 COMPLETE CBC AUTOMATED: CPT

## 2025-05-07 PROCEDURE — 83735 ASSAY OF MAGNESIUM: CPT

## 2025-05-07 PROCEDURE — 6360000002 HC RX W HCPCS

## 2025-05-07 PROCEDURE — 6370000000 HC RX 637 (ALT 250 FOR IP): Performed by: INTERNAL MEDICINE

## 2025-05-07 PROCEDURE — 80048 BASIC METABOLIC PNL TOTAL CA: CPT

## 2025-05-07 PROCEDURE — 6370000000 HC RX 637 (ALT 250 FOR IP)

## 2025-05-07 PROCEDURE — 85730 THROMBOPLASTIN TIME PARTIAL: CPT

## 2025-05-07 PROCEDURE — 80061 LIPID PANEL: CPT

## 2025-05-07 PROCEDURE — 84484 ASSAY OF TROPONIN QUANT: CPT

## 2025-05-07 PROCEDURE — 2140000000 HC CCU INTERMEDIATE R&B

## 2025-05-07 PROCEDURE — 2500000003 HC RX 250 WO HCPCS: Performed by: NURSE PRACTITIONER

## 2025-05-07 PROCEDURE — 83036 HEMOGLOBIN GLYCOSYLATED A1C: CPT

## 2025-05-07 PROCEDURE — 2580000003 HC RX 258

## 2025-05-07 PROCEDURE — 92526 ORAL FUNCTION THERAPY: CPT

## 2025-05-07 PROCEDURE — 93005 ELECTROCARDIOGRAM TRACING: CPT

## 2025-05-07 PROCEDURE — 85025 COMPLETE CBC W/AUTO DIFF WBC: CPT

## 2025-05-07 RX ORDER — INSULIN LISPRO 100 [IU]/ML
0-4 INJECTION, SOLUTION INTRAVENOUS; SUBCUTANEOUS
Status: DISCONTINUED | OUTPATIENT
Start: 2025-05-07 | End: 2025-05-14 | Stop reason: HOSPADM

## 2025-05-07 RX ORDER — METOPROLOL TARTRATE 1 MG/ML
INJECTION, SOLUTION INTRAVENOUS
Status: COMPLETED
Start: 2025-05-07 | End: 2025-05-07

## 2025-05-07 RX ORDER — HEPARIN SODIUM 1000 [USP'U]/ML
30 INJECTION, SOLUTION INTRAVENOUS; SUBCUTANEOUS PRN
Status: DISCONTINUED | OUTPATIENT
Start: 2025-05-07 | End: 2025-05-08

## 2025-05-07 RX ORDER — MAGNESIUM SULFATE IN WATER 40 MG/ML
INJECTION, SOLUTION INTRAVENOUS
Status: COMPLETED
Start: 2025-05-07 | End: 2025-05-07

## 2025-05-07 RX ORDER — ATORVASTATIN CALCIUM 20 MG/1
20 TABLET, FILM COATED ORAL DAILY
Status: DISCONTINUED | OUTPATIENT
Start: 2025-05-07 | End: 2025-05-08

## 2025-05-07 RX ORDER — DEXTROSE MONOHYDRATE 100 MG/ML
INJECTION, SOLUTION INTRAVENOUS CONTINUOUS PRN
Status: DISCONTINUED | OUTPATIENT
Start: 2025-05-07 | End: 2025-05-14 | Stop reason: HOSPADM

## 2025-05-07 RX ORDER — SODIUM CHLORIDE, SODIUM LACTATE, POTASSIUM CHLORIDE, AND CALCIUM CHLORIDE .6; .31; .03; .02 G/100ML; G/100ML; G/100ML; G/100ML
500 INJECTION, SOLUTION INTRAVENOUS ONCE
Status: COMPLETED | OUTPATIENT
Start: 2025-05-07 | End: 2025-05-07

## 2025-05-07 RX ORDER — METOPROLOL SUCCINATE 25 MG/1
25 TABLET, EXTENDED RELEASE ORAL DAILY
Status: DISCONTINUED | OUTPATIENT
Start: 2025-05-07 | End: 2025-05-14 | Stop reason: HOSPADM

## 2025-05-07 RX ORDER — METOPROLOL TARTRATE 1 MG/ML
5 INJECTION, SOLUTION INTRAVENOUS ONCE
Status: COMPLETED | OUTPATIENT
Start: 2025-05-07 | End: 2025-05-07

## 2025-05-07 RX ORDER — HEPARIN SODIUM 1000 [USP'U]/ML
60 INJECTION, SOLUTION INTRAVENOUS; SUBCUTANEOUS PRN
Status: DISCONTINUED | OUTPATIENT
Start: 2025-05-07 | End: 2025-05-08

## 2025-05-07 RX ORDER — GLUCAGON 1 MG/ML
1 KIT INJECTION PRN
Status: DISCONTINUED | OUTPATIENT
Start: 2025-05-07 | End: 2025-05-14 | Stop reason: HOSPADM

## 2025-05-07 RX ORDER — HEPARIN SODIUM 1000 [USP'U]/ML
60 INJECTION, SOLUTION INTRAVENOUS; SUBCUTANEOUS ONCE
Status: COMPLETED | OUTPATIENT
Start: 2025-05-07 | End: 2025-05-07

## 2025-05-07 RX ORDER — HEPARIN SODIUM 10000 [USP'U]/100ML
5-30 INJECTION, SOLUTION INTRAVENOUS CONTINUOUS
Status: DISCONTINUED | OUTPATIENT
Start: 2025-05-07 | End: 2025-05-08

## 2025-05-07 RX ORDER — MAGNESIUM SULFATE IN WATER 40 MG/ML
2000 INJECTION, SOLUTION INTRAVENOUS ONCE
Status: COMPLETED | OUTPATIENT
Start: 2025-05-07 | End: 2025-05-07

## 2025-05-07 RX ORDER — CALCIUM GLUCONATE 20 MG/ML
1000 INJECTION, SOLUTION INTRAVENOUS ONCE
Status: COMPLETED | OUTPATIENT
Start: 2025-05-07 | End: 2025-05-07

## 2025-05-07 RX ADMIN — SODIUM CHLORIDE, PRESERVATIVE FREE 10 ML: 5 INJECTION INTRAVENOUS at 09:41

## 2025-05-07 RX ADMIN — METOPROLOL TARTRATE 5 MG: 1 INJECTION, SOLUTION INTRAVENOUS at 19:29

## 2025-05-07 RX ADMIN — HEPARIN SODIUM 3900 UNITS: 1000 INJECTION INTRAVENOUS; SUBCUTANEOUS at 20:54

## 2025-05-07 RX ADMIN — POTASSIUM PHOSPHATE, MONOBASIC AND POTASSIUM PHOSPHATE, DIBASIC 10 MMOL: 224; 236 INJECTION, SOLUTION, CONCENTRATE INTRAVENOUS at 21:36

## 2025-05-07 RX ADMIN — HEPARIN SODIUM 12 UNITS/KG/HR: 10000 INJECTION, SOLUTION INTRAVENOUS at 21:02

## 2025-05-07 RX ADMIN — METOROPROLOL TARTRATE 5 MG: 5 INJECTION, SOLUTION INTRAVENOUS at 19:29

## 2025-05-07 RX ADMIN — METOPROLOL TARTRATE 5 MG: 1 INJECTION, SOLUTION INTRAVENOUS at 19:13

## 2025-05-07 RX ADMIN — ASPIRIN 300 MG: 300 SUPPOSITORY RECTAL at 15:36

## 2025-05-07 RX ADMIN — MAGNESIUM SULFATE HEPTAHYDRATE 2000 MG: 40 INJECTION, SOLUTION INTRAVENOUS at 19:11

## 2025-05-07 RX ADMIN — MAGNESIUM SULFATE IN WATER 2000 MG: 40 INJECTION, SOLUTION INTRAVENOUS at 19:11

## 2025-05-07 RX ADMIN — ENOXAPARIN SODIUM 40 MG: 100 INJECTION SUBCUTANEOUS at 09:41

## 2025-05-07 RX ADMIN — SODIUM CHLORIDE, POTASSIUM CHLORIDE, SODIUM LACTATE AND CALCIUM CHLORIDE 500 ML: 600; 310; 30; 20 INJECTION, SOLUTION INTRAVENOUS at 19:09

## 2025-05-07 RX ADMIN — INSULIN LISPRO 1 UNITS: 100 INJECTION, SOLUTION INTRAVENOUS; SUBCUTANEOUS at 20:54

## 2025-05-07 RX ADMIN — METOROPROLOL TARTRATE 5 MG: 5 INJECTION, SOLUTION INTRAVENOUS at 19:13

## 2025-05-07 RX ADMIN — SODIUM CHLORIDE, PRESERVATIVE FREE 10 ML: 5 INJECTION INTRAVENOUS at 20:54

## 2025-05-07 RX ADMIN — METOPROLOL SUCCINATE 25 MG: 25 TABLET, FILM COATED, EXTENDED RELEASE ORAL at 20:54

## 2025-05-07 RX ADMIN — CALCIUM GLUCONATE 1000 MG: 20 INJECTION, SOLUTION INTRAVENOUS at 20:52

## 2025-05-07 RX ADMIN — ATORVASTATIN CALCIUM 20 MG: 20 TABLET, FILM COATED ORAL at 20:54

## 2025-05-07 RX ADMIN — POTASSIUM BICARBONATE 20 MEQ: 782 TABLET, EFFERVESCENT ORAL at 20:54

## 2025-05-07 NOTE — H&P
Hospital Medicine History & Physical      PCP: Carl Monroy MD    Date of Admission: 5/6/2025    Date of Service May 2025    Chief Complaint:  stroke  like symptoms      History Of Present Illness:     64 y.o. male presented with stroke like symptoms .  HE STATES HE WAS FINE, AND THEN HE DEVELOPED LEFT SIDED ARM AND LEG WEAKNESS AND FACIAL DROOP.  HE DENIES TRAUMA     Past Medical History:          Diagnosis Date    Heartburn     Hyperlipidemia     Hypertension     Polyp of colon     Smokers' cough (HCC)        Past Surgical History:          Procedure Laterality Date    APPENDECTOMY      COLONOSCOPY      COLONOSCOPY N/A 3/16/2020    COLORECTAL CANCER SCREENING, NOT HIGH RISK performed by Niall Calvert III, MD at [facility] ENDOSCOPY    HERNIA REPAIR      SKIN CANCER EXCISION Left     SIDE OF FACE       Medications Prior to Admission:      Prior to Admission medications    Not on File       Allergies:  Patient has no known allergies.    Social History:      The patient currently lives [facility]    TOBACCO:   reports that he has been smoking cigarettes. He has a 48 pack-year smoking history. He has never used smokeless tobacco.  ETOH:   reports no history of alcohol use.      Family History:       Reviewed in detail and negative for DM, CAD, Cancer, CVA. Positive as follows:        Problem Relation Age of Onset    Breast Cancer Mother     Cancer Mother     High Blood Pressure Father     Heart Disease Father     Heart Attack Father     Prostate Cancer Father     Prostate Cancer Sister     Other Sister        REVIEW OF SYSTEMS:   Pertinent positives as noted in the HPI. All other systems reviewed and negative.    PHYSICAL EXAM:    /85   Pulse 95   Temp 98.6 °F (37 °C) (Oral)   Resp 18   Ht 1.676 m (5' 6\")   Wt 65.8 kg (145 lb)   SpO2 96%   BMI 23.40 kg/m²     General appearance:

## 2025-05-07 NOTE — CARE COORDINATION
5/7/25  Transition of Care update.  Patient admitted for stroke like symptoms .  Patient is NPO after failing his swallow. Patient is pending PT/OT and speech evals as well as a neurology consult. Patient was living at home with his significant other prior to admit. Patient was independent with ADL's and drove . Patient did not have any DME needs.  PCP was Dr. Jacobson who has retired and patient waiting to see the physician who is taking over the practice. Pharmacy choice is [facility] in [place]. Discharge [facility]  with referral made to [facility] and pending.  Patient also choiced for back up [facility] option and would like [place] with referral placed and pending. Discharge goal is ARU vs [facility]. / to follow.    Electronically signed by RICHARD Saavedra on 5/7/2025 at 10:40 AM     Case Management Assessment  Initial Evaluation    Date/Time of Evaluation: 5/7/2025 10:40 AM  Assessment Completed by: RICHARD Saavedra    If patient is discharged prior to next notation, then this note serves as note for discharge by case management.    Patient Name: Esteban Terry                   YOB: 1960  Diagnosis: Stroke-like symptoms [R29.90]                   Date / Time: 5/6/2025  5:49 PM    Patient Admission Status: Observation   Readmission Risk (Low < 19, Mod (19-27), High > 27): No data recorded  Current PCP: Carl Monroy MD  PCP verified by ? Yes (PCP retired waiting on new doc to take over practice for Dr. Jacobson)    Chart Reviewed: Yes      History Provided by: Patient  Patient Orientation: Alert and Oriented, Person, Place, Situation    Patient Cognition: Alert    Hospitalization in the last 30 days (Readmission):  No    If yes, Readmission Assessment in  Navigator will be completed.    Advance Directives:      Code Status: Full Code   Patient's Primary Decision Maker is:        Discharge Planning:    Patient lives with: Spouse/Significant Other Type of Home:

## 2025-05-07 NOTE — PLAN OF CARE
Problem: ABCDS Injury Assessment  Goal: Absence of physical injury  5/6/2025 2231 by Renetta Brown, RN  Outcome: Progressing     Problem: Safety - Adult  Goal: Free from fall injury  5/6/2025 2231 by Renetta Brown RN  Outcome: Progressing     Problem: Skin/Tissue Integrity  Goal: Skin integrity remains intact  Description: 1.  Monitor for areas of redness and/or skin breakdown2.  Assess vascular access sites hourly3.  Every 4-6 hours minimum:  Change oxygen saturation probe site4.  Every 4-6 hours:  If on nasal continuous positive airway pressure, respiratory therapy assess nares and determine need for appliance change or resting period  5/6/2025 2231 by Renetta Brown, RN  Outcome: Progressing     Problem: Discharge Planning  Goal: Discharge to home or other facility with appropriate resources  5/6/2025 2231 by Renetta Brown, RN  Outcome: Progressing

## 2025-05-08 ENCOUNTER — APPOINTMENT (OUTPATIENT)
Age: 65
DRG: 065 | End: 2025-05-08
Attending: INTERNAL MEDICINE
Payer: MEDICARE

## 2025-05-08 PROBLEM — I63.9 ACUTE ISCHEMIC STROKE (HCC): Status: ACTIVE | Noted: 2025-05-08

## 2025-05-08 PROBLEM — R29.90 STROKE-LIKE SYMPTOMS: Status: RESOLVED | Noted: 2025-05-06 | Resolved: 2025-05-08

## 2025-05-08 PROBLEM — I48.91 NEW ONSET A-FIB (HCC): Status: ACTIVE | Noted: 2025-05-08

## 2025-05-08 PROBLEM — E44.0 MODERATE PROTEIN-CALORIE MALNUTRITION: Chronic | Status: ACTIVE | Noted: 2025-05-08

## 2025-05-08 LAB
ANION GAP SERPL CALCULATED.3IONS-SCNC: 17 MMOL/L (ref 7–16)
BUN SERPL-MCNC: 18 MG/DL (ref 8–23)
CALCIUM SERPL-MCNC: 9.1 MG/DL (ref 8.8–10.2)
CHLORIDE SERPL-SCNC: 102 MMOL/L (ref 98–107)
CO2 SERPL-SCNC: 19 MMOL/L (ref 22–29)
CREAT SERPL-MCNC: 0.9 MG/DL (ref 0.7–1.2)
ECHO AO ASC DIAM: 3 CM
ECHO AO ASCENDING AORTA INDEX: 1.72 CM/M2
ECHO AV AREA PEAK VELOCITY: 2.2 CM2
ECHO AV AREA VTI: 2.3 CM2
ECHO AV AREA/BSA PEAK VELOCITY: 1.3 CM2/M2
ECHO AV AREA/BSA VTI: 1.3 CM2/M2
ECHO AV CUSP MM: 2.2 CM
ECHO AV MEAN GRADIENT: 2 MMHG
ECHO AV MEAN VELOCITY: 0.7 M/S
ECHO AV PEAK GRADIENT: 4 MMHG
ECHO AV PEAK VELOCITY: 1 M/S
ECHO AV VELOCITY RATIO: 0.7
ECHO AV VTI: 19.1 CM
ECHO BSA: 1.75 M2
ECHO EST RA PRESSURE: 3 MMHG
ECHO LA DIAMETER INDEX: 1.72 CM/M2
ECHO LA DIAMETER: 3 CM
ECHO LA VOL A-L A2C: 45 ML (ref 18–58)
ECHO LA VOL A-L A4C: 40 ML (ref 18–58)
ECHO LA VOL BP: 42 ML (ref 18–58)
ECHO LA VOL MOD A2C: 44 ML (ref 18–58)
ECHO LA VOL MOD A4C: 38 ML (ref 18–58)
ECHO LA VOL/BSA BIPLANE: 24 ML/M2 (ref 16–34)
ECHO LA VOLUME AREA LENGTH: 44 ML
ECHO LA VOLUME INDEX A-L A2C: 26 ML/M2 (ref 16–34)
ECHO LA VOLUME INDEX A-L A4C: 23 ML/M2 (ref 16–34)
ECHO LA VOLUME INDEX AREA LENGTH: 25 ML/M2 (ref 16–34)
ECHO LA VOLUME INDEX MOD A2C: 25 ML/M2 (ref 16–34)
ECHO LA VOLUME INDEX MOD A4C: 22 ML/M2 (ref 16–34)
ECHO LV EDV A2C: 39 ML
ECHO LV EDV A4C: 44 ML
ECHO LV EDV BP: 42 ML (ref 67–155)
ECHO LV EDV INDEX A4C: 25 ML/M2
ECHO LV EDV INDEX BP: 24 ML/M2
ECHO LV EDV NDEX A2C: 22 ML/M2
ECHO LV EF PHYSICIAN: 55 %
ECHO LV EJECTION FRACTION A2C: 57 %
ECHO LV EJECTION FRACTION A4C: 57 %
ECHO LV EJECTION FRACTION BIPLANE: 57 % (ref 55–100)
ECHO LV ESV A2C: 17 ML
ECHO LV ESV A4C: 19 ML
ECHO LV ESV BP: 18 ML (ref 22–58)
ECHO LV ESV INDEX A2C: 10 ML/M2
ECHO LV ESV INDEX A4C: 11 ML/M2
ECHO LV ESV INDEX BP: 10 ML/M2
ECHO LV FRACTIONAL SHORTENING: 29 % (ref 28–44)
ECHO LV INTERNAL DIMENSION DIASTOLE INDEX: 2.36 CM/M2
ECHO LV INTERNAL DIMENSION DIASTOLIC: 4.1 CM (ref 4.2–5.9)
ECHO LV INTERNAL DIMENSION SYSTOLIC INDEX: 1.67 CM/M2
ECHO LV INTERNAL DIMENSION SYSTOLIC: 2.9 CM
ECHO LV ISOVOLUMETRIC RELAXATION TIME (IVRT): 74.2 MS
ECHO LV IVSD: 1 CM (ref 0.6–1)
ECHO LV IVSS: 1.1 CM
ECHO LV MASS 2D: 141.5 G (ref 88–224)
ECHO LV MASS INDEX 2D: 81.3 G/M2 (ref 49–115)
ECHO LV POSTERIOR WALL DIASTOLIC: 1.1 CM (ref 0.6–1)
ECHO LV POSTERIOR WALL SYSTOLIC: 1.2 CM
ECHO LV RELATIVE WALL THICKNESS RATIO: 0.54
ECHO LVOT AREA: 3.1 CM2
ECHO LVOT AV VTI INDEX: 0.73
ECHO LVOT DIAM: 2 CM
ECHO LVOT MEAN GRADIENT: 1 MMHG
ECHO LVOT PEAK GRADIENT: 2 MMHG
ECHO LVOT PEAK VELOCITY: 0.7 M/S
ECHO LVOT STROKE VOLUME INDEX: 25.1 ML/M2
ECHO LVOT SV: 43.6 ML
ECHO LVOT VTI: 13.9 CM
ECHO MV "A" WAVE DURATION: 108.5 MSEC
ECHO MV A VELOCITY: 0.73 M/S
ECHO MV AREA PHT: 2.8 CM2
ECHO MV AREA VTI: 2 CM2
ECHO MV E DECELERATION TIME (DT): 268.6 MS
ECHO MV E VELOCITY: 0.57 M/S
ECHO MV E/A RATIO: 0.78
ECHO MV LVOT VTI INDEX: 1.59
ECHO MV MAX VELOCITY: 1 M/S
ECHO MV MEAN GRADIENT: 1 MMHG
ECHO MV MEAN VELOCITY: 0.5 M/S
ECHO MV PEAK GRADIENT: 4 MMHG
ECHO MV PRESSURE HALF TIME (PHT): 79 MS
ECHO MV VTI: 22.1 CM
ECHO PV MAX VELOCITY: 0.9 M/S
ECHO PV MEAN GRADIENT: 2 MMHG
ECHO PV MEAN VELOCITY: 0.7 M/S
ECHO PV PEAK GRADIENT: 3 MMHG
ECHO PV VTI: 15.2 CM
ECHO PVEIN A DURATION: 139.9 MS
ECHO PVEIN A VELOCITY: 0.3 M/S
ECHO PVEIN PEAK D VELOCITY: 0.4 M/S
ECHO PVEIN PEAK S VELOCITY: 0.6 M/S
ECHO PVEIN S/D RATIO: 1.5
ECHO RIGHT VENTRICULAR SYSTOLIC PRESSURE (RVSP): 18 MMHG
ECHO RV INTERNAL DIMENSION: 3.1 CM
ECHO TV REGURGITANT MAX VELOCITY: 1.95 M/S
ECHO TV REGURGITANT PEAK GRADIENT: 15 MMHG
ERYTHROCYTE [DISTWIDTH] IN BLOOD BY AUTOMATED COUNT: 13 % (ref 11.5–15)
GFR, ESTIMATED: >90 ML/MIN/1.73M2
GLUCOSE BLD-MCNC: 103 MG/DL (ref 74–99)
GLUCOSE BLD-MCNC: 106 MG/DL (ref 74–99)
GLUCOSE BLD-MCNC: 163 MG/DL (ref 74–99)
GLUCOSE BLD-MCNC: 98 MG/DL (ref 74–99)
GLUCOSE SERPL-MCNC: 110 MG/DL (ref 74–99)
HCT VFR BLD AUTO: 49.9 % (ref 37–54)
HGB BLD-MCNC: 17.4 G/DL (ref 12.5–16.5)
MAGNESIUM SERPL-MCNC: 2.5 MG/DL (ref 1.6–2.4)
MCH RBC QN AUTO: 30.9 PG (ref 26–35)
MCHC RBC AUTO-ENTMCNC: 34.9 G/DL (ref 32–34.5)
MCV RBC AUTO: 88.6 FL (ref 80–99.9)
PARTIAL THROMBOPLASTIN TIME: 51.2 SEC (ref 24.5–35.1)
PARTIAL THROMBOPLASTIN TIME: 63.4 SEC (ref 24.5–35.1)
PLATELET # BLD AUTO: 216 K/UL (ref 130–450)
PMV BLD AUTO: 10.7 FL (ref 7–12)
POTASSIUM SERPL-SCNC: 4.1 MMOL/L (ref 3.5–5.1)
RBC # BLD AUTO: 5.63 M/UL (ref 3.8–5.8)
SODIUM SERPL-SCNC: 138 MMOL/L (ref 136–145)
T4 FREE SERPL-MCNC: 1.4 NG/DL (ref 0.9–1.7)
TSH SERPL DL<=0.05 MIU/L-ACNC: 2.68 UIU/ML (ref 0.27–4.2)
WBC OTHER # BLD: 10.8 K/UL (ref 4.5–11.5)

## 2025-05-08 PROCEDURE — 82962 GLUCOSE BLOOD TEST: CPT

## 2025-05-08 PROCEDURE — 93306 TTE W/DOPPLER COMPLETE: CPT

## 2025-05-08 PROCEDURE — APPSS60 APP SPLIT SHARED TIME 46-60 MINUTES

## 2025-05-08 PROCEDURE — 2500000003 HC RX 250 WO HCPCS: Performed by: NURSE PRACTITIONER

## 2025-05-08 PROCEDURE — 85027 COMPLETE CBC AUTOMATED: CPT

## 2025-05-08 PROCEDURE — 36415 COLL VENOUS BLD VENIPUNCTURE: CPT

## 2025-05-08 PROCEDURE — 83735 ASSAY OF MAGNESIUM: CPT

## 2025-05-08 PROCEDURE — 99222 1ST HOSP IP/OBS MODERATE 55: CPT | Performed by: NURSE PRACTITIONER

## 2025-05-08 PROCEDURE — 6370000000 HC RX 637 (ALT 250 FOR IP)

## 2025-05-08 PROCEDURE — 84443 ASSAY THYROID STIM HORMONE: CPT

## 2025-05-08 PROCEDURE — 92526 ORAL FUNCTION THERAPY: CPT

## 2025-05-08 PROCEDURE — 6370000000 HC RX 637 (ALT 250 FOR IP): Performed by: NURSE PRACTITIONER

## 2025-05-08 PROCEDURE — 80048 BASIC METABOLIC PNL TOTAL CA: CPT

## 2025-05-08 PROCEDURE — 93306 TTE W/DOPPLER COMPLETE: CPT | Performed by: INTERNAL MEDICINE

## 2025-05-08 PROCEDURE — 85730 THROMBOPLASTIN TIME PARTIAL: CPT

## 2025-05-08 PROCEDURE — 2140000000 HC CCU INTERMEDIATE R&B

## 2025-05-08 PROCEDURE — 97530 THERAPEUTIC ACTIVITIES: CPT

## 2025-05-08 PROCEDURE — 84439 ASSAY OF FREE THYROXINE: CPT

## 2025-05-08 PROCEDURE — 99222 1ST HOSP IP/OBS MODERATE 55: CPT | Performed by: INTERNAL MEDICINE

## 2025-05-08 RX ORDER — CLOPIDOGREL BISULFATE 75 MG/1
75 TABLET ORAL DAILY
Status: DISCONTINUED | OUTPATIENT
Start: 2025-05-08 | End: 2025-05-08

## 2025-05-08 RX ORDER — ATORVASTATIN CALCIUM 80 MG/1
80 TABLET, FILM COATED ORAL DAILY
Status: DISCONTINUED | OUTPATIENT
Start: 2025-05-08 | End: 2025-05-14 | Stop reason: HOSPADM

## 2025-05-08 RX ADMIN — SODIUM CHLORIDE, PRESERVATIVE FREE 10 ML: 5 INJECTION INTRAVENOUS at 21:08

## 2025-05-08 RX ADMIN — LISINOPRIL 20 MG: 20 TABLET ORAL at 09:20

## 2025-05-08 RX ADMIN — APIXABAN 5 MG: 5 TABLET, FILM COATED ORAL at 16:18

## 2025-05-08 RX ADMIN — METOPROLOL SUCCINATE 25 MG: 25 TABLET, FILM COATED, EXTENDED RELEASE ORAL at 09:20

## 2025-05-08 RX ADMIN — ATORVASTATIN CALCIUM 80 MG: 80 TABLET, FILM COATED ORAL at 21:08

## 2025-05-08 RX ADMIN — ASPIRIN 81 MG CHEWABLE TABLET 81 MG: 81 TABLET CHEWABLE at 09:20

## 2025-05-08 NOTE — CARE COORDINATION
5/8/25  Transition of Care update.  Patient pending a neurology and cardiology consult. Speech evaluated and patient is being advanced to Soft and bite size consistency solids with thin liquids. Patient is on a heparin drip and Pending an ECHO with bubble study. PT/OT evals reveal AM-PAC of 13/24 for PT and 14/24 for OT.  Discharge goal is ARU at [facility] vs [facility] stay at [place] pending insurance approval. SW/ARASH to follow.    Electronically signed by RICHARD Saavedra on 5/8/2025 at 11:01 AM

## 2025-05-08 NOTE — PLAN OF CARE
Problem: Discharge Planning  Goal: Discharge to home or other facility with appropriate resources  5/7/2025 2320 by Renetta Brown, RN  Outcome: Progressing     Problem: Skin/Tissue Integrity  Goal: Skin integrity remains intact  Description: 1.  Monitor for areas of redness and/or skin breakdown2.  Assess vascular access sites hourly3.  Every 4-6 hours minimum:  Change oxygen saturation probe site4.  Every 4-6 hours:  If on nasal continuous positive airway pressure, respiratory therapy assess nares and determine need for appliance change or resting period  5/7/2025 2320 by Renetta Brown, RN  Outcome: Progressing     Problem: Safety - Adult  Goal: Free from fall injury  5/7/2025 2320 by Renetta Brown, RN  Outcome: Progressing     Problem: ABCDS Injury Assessment  Goal: Absence of physical injury  5/7/2025 2320 by Renetta Brown, RN  Outcome: Progressing

## 2025-05-08 NOTE — SIGNIFICANT EVENT
Critical Care - Rapid Response Team Note      Date of event: 5/7/2025   Time of event: 18:53    Esteban Terry 64 y.o. year old male   YOB: 1960     PCP:  Carl Monroy MD   [facility]   Witnessed? : [x]Yes  [] No  Initial Code status: [x] Full  [] DNR-CCA  []DNR-CC    []Limited  ______________________________________________________________________  Reason for RRT:   Tachycardia/Tachyarrhythmia    Chief Complaint for this admission: No chief complaint on file.      Admit date:  5/6/2025     Admitting Diagnosis: Stroke-like symptoms [R29.90]      Current Diagnosis: The encounter diagnosis was Cerebrovascular accident (CVA), unspecified mechanism (HCC).     Subjective:   Patient was admitted initially for left-sided facial droop, arm, and leg weakness. He is being managed on the floors for concerns for acute CVA.      RRT was called for tachycardia of HR 180s. Upon arrival, patient was awake, alert, oriented x3, does not appear to be in acute distress, asymptomatic. Patient denied shortness of breath, palpitations, or chest pain. Initial vitals were  with /103, SpO2 97% on room air. EKG obtained revealed SVT with rate of 152. Patient given 5 mg of lopressor. Magnesium sulfate 2g, Calcium gluconate 1g, and 500 ml bolus of lactated ringers was ordered. HR remained in 120s-130s. Repeat EKG showed atrial fibrillation with RVR. Given an additional 5 mg of lopressor. Heart rate went down to 90s-110s. Heparin drip was started, metoprolol succinate 25 mg OD, and echo was ordered. Cardiology was consulted for new-onset Afib with RVR.    Case discussed with Critical Care Intensivist, Dr. Stephen and PCP Dr. Graff      Objective:   Initial Assessment on arrival:  Vital signs: Temp: 98.4, BP: 160/103, RR: 18, HR: 181 SpO2:97%    Airway and Condition: Conscious and Breathing noted    Lungs And Circulation: clear to auscultation bilaterally noted                  Neurologic: follows commands

## 2025-05-08 NOTE — PATIENT CARE CONFERENCE
P Quality Flow/Interdisciplinary Rounds Progress Note        Quality Flow Rounds held on May 8, 2025    Disciplines Attending:  Bedside Nurse, , , and Nursing Unit Leadership    Esteban Terry was admitted on 5/6/2025  5:49 PM    Anticipated Discharge Date:       Disposition:    Gennaro Score:  Gennaro Scale Score: 17    [facility] RISK OF UNPLANNED READMISSION 2.0             14.9 Total Score        Discussed patient goal for the day, patient clinical progression, and barriers to discharge.  The following Goal(s) of the Day/Commitment(s) have been identified:  Report labs/diagnostics      Janeth Desouza RN  May 8, 2025

## 2025-05-09 ENCOUNTER — APPOINTMENT (OUTPATIENT)
Dept: GENERAL RADIOLOGY | Age: 65
DRG: 065 | End: 2025-05-09
Attending: INTERNAL MEDICINE
Payer: MEDICARE

## 2025-05-09 LAB
ANION GAP SERPL CALCULATED.3IONS-SCNC: 13 MMOL/L (ref 7–16)
BUN SERPL-MCNC: 22 MG/DL (ref 8–23)
CALCIUM SERPL-MCNC: 8.9 MG/DL (ref 8.8–10.2)
CHLORIDE SERPL-SCNC: 106 MMOL/L (ref 98–107)
CO2 SERPL-SCNC: 21 MMOL/L (ref 22–29)
CREAT SERPL-MCNC: 0.9 MG/DL (ref 0.7–1.2)
EKG ATRIAL RATE: 131 BPM
EKG ATRIAL RATE: 152 BPM
EKG Q-T INTERVAL: 292 MS
EKG Q-T INTERVAL: 324 MS
EKG QRS DURATION: 76 MS
EKG QRS DURATION: 82 MS
EKG QTC CALCULATION (BAZETT): 434 MS
EKG QTC CALCULATION (BAZETT): 464 MS
EKG R AXIS: 54 DEGREES
EKG R AXIS: 63 DEGREES
EKG T AXIS: -50 DEGREES
EKG T AXIS: -54 DEGREES
EKG VENTRICULAR RATE: 108 BPM
EKG VENTRICULAR RATE: 152 BPM
ERYTHROCYTE [DISTWIDTH] IN BLOOD BY AUTOMATED COUNT: 13.2 % (ref 11.5–15)
GFR, ESTIMATED: >90 ML/MIN/1.73M2
GLUCOSE BLD-MCNC: 101 MG/DL (ref 74–99)
GLUCOSE BLD-MCNC: 104 MG/DL (ref 74–99)
GLUCOSE BLD-MCNC: 123 MG/DL (ref 74–99)
GLUCOSE BLD-MCNC: 126 MG/DL (ref 74–99)
GLUCOSE SERPL-MCNC: 108 MG/DL (ref 74–99)
HCT VFR BLD AUTO: 46.7 % (ref 37–54)
HGB BLD-MCNC: 16.2 G/DL (ref 12.5–16.5)
MAGNESIUM SERPL-MCNC: 2.2 MG/DL (ref 1.6–2.4)
MCH RBC QN AUTO: 30.7 PG (ref 26–35)
MCHC RBC AUTO-ENTMCNC: 34.7 G/DL (ref 32–34.5)
MCV RBC AUTO: 88.4 FL (ref 80–99.9)
PARTIAL THROMBOPLASTIN TIME: 34.2 SEC (ref 24.5–35.1)
PLATELET # BLD AUTO: 197 K/UL (ref 130–450)
PMV BLD AUTO: 11.3 FL (ref 7–12)
POTASSIUM SERPL-SCNC: 4 MMOL/L (ref 3.5–5.1)
RBC # BLD AUTO: 5.28 M/UL (ref 3.8–5.8)
SODIUM SERPL-SCNC: 139 MMOL/L (ref 136–145)
WBC OTHER # BLD: 9.9 K/UL (ref 4.5–11.5)

## 2025-05-09 PROCEDURE — 85730 THROMBOPLASTIN TIME PARTIAL: CPT

## 2025-05-09 PROCEDURE — 74230 X-RAY XM SWLNG FUNCJ C+: CPT

## 2025-05-09 PROCEDURE — 6370000000 HC RX 637 (ALT 250 FOR IP)

## 2025-05-09 PROCEDURE — 92526 ORAL FUNCTION THERAPY: CPT

## 2025-05-09 PROCEDURE — 6370000000 HC RX 637 (ALT 250 FOR IP): Performed by: FAMILY MEDICINE

## 2025-05-09 PROCEDURE — 83735 ASSAY OF MAGNESIUM: CPT

## 2025-05-09 PROCEDURE — 85027 COMPLETE CBC AUTOMATED: CPT

## 2025-05-09 PROCEDURE — 92611 MOTION FLUOROSCOPY/SWALLOW: CPT

## 2025-05-09 PROCEDURE — 80048 BASIC METABOLIC PNL TOTAL CA: CPT

## 2025-05-09 PROCEDURE — 82962 GLUCOSE BLOOD TEST: CPT

## 2025-05-09 PROCEDURE — 2500000003 HC RX 250 WO HCPCS: Performed by: NURSE PRACTITIONER

## 2025-05-09 PROCEDURE — 36415 COLL VENOUS BLD VENIPUNCTURE: CPT

## 2025-05-09 PROCEDURE — 2140000000 HC CCU INTERMEDIATE R&B

## 2025-05-09 PROCEDURE — 99232 SBSQ HOSP IP/OBS MODERATE 35: CPT | Performed by: INTERNAL MEDICINE

## 2025-05-09 PROCEDURE — 6370000000 HC RX 637 (ALT 250 FOR IP): Performed by: NURSE PRACTITIONER

## 2025-05-09 RX ORDER — LIDOCAINE 4 G/G
1 PATCH TOPICAL DAILY
Status: DISCONTINUED | OUTPATIENT
Start: 2025-05-09 | End: 2025-05-14 | Stop reason: HOSPADM

## 2025-05-09 RX ADMIN — METOPROLOL SUCCINATE 25 MG: 25 TABLET, FILM COATED, EXTENDED RELEASE ORAL at 10:21

## 2025-05-09 RX ADMIN — LISINOPRIL 20 MG: 20 TABLET ORAL at 10:21

## 2025-05-09 RX ADMIN — APIXABAN 5 MG: 5 TABLET, FILM COATED ORAL at 10:21

## 2025-05-09 RX ADMIN — SODIUM CHLORIDE, PRESERVATIVE FREE 10 ML: 5 INJECTION INTRAVENOUS at 12:07

## 2025-05-09 RX ADMIN — ATORVASTATIN CALCIUM 80 MG: 80 TABLET, FILM COATED ORAL at 20:23

## 2025-05-09 RX ADMIN — SODIUM CHLORIDE, PRESERVATIVE FREE 10 ML: 5 INJECTION INTRAVENOUS at 20:24

## 2025-05-09 RX ADMIN — ASPIRIN 81 MG CHEWABLE TABLET 81 MG: 81 TABLET CHEWABLE at 10:21

## 2025-05-09 RX ADMIN — APIXABAN 5 MG: 5 TABLET, FILM COATED ORAL at 20:23

## 2025-05-09 NOTE — CARE COORDINATION
5/9/25  Transition of Care update. Patient is NPO for a MBS today. Heparin has been transitioned to Eliquis.  ECHO complete with EF 55-60%.   MRI has been read as normal however per neurology note there is a clear acute basilar stroke.  Patient continues with left hemiparesis.  Discharge goal is ARU at [facility] vs [facility] stay at [place] pending insurance approval. Pre-cert will need obtained prior to discharge. Transport started and pending in Epic. BROOKE/ARASH to follow.    3:45pm  Pre-cert started for ARU at [facility].    Electronically signed by RICHARD Saavedra on 5/9/2025 at 9:45 AM

## 2025-05-09 NOTE — PATIENT CARE CONFERENCE
P Quality Flow/Interdisciplinary Rounds Progress Note        Quality Flow Rounds held on May 9, 2025    Disciplines Attending:  Bedside Nurse, , , and Nursing Unit Leadership    Estbean Terry was admitted on 5/6/2025  5:49 PM    Anticipated Discharge Date:       Disposition:    Gennaro Score:  Gennaro Scale Score: 16    [facility] RISK OF UNPLANNED READMISSION 2.0             14.4 Total Score        Discussed patient goal for the day, patient clinical progression, and barriers to discharge.  The following Goal(s) of the Day/Commitment(s) have been identified:  Report labs/diagnostics      Janeth Desouza RN  May 9, 2025

## 2025-05-09 NOTE — PLAN OF CARE
Problem: Discharge Planning  Goal: Discharge to home or other facility with appropriate resources  Outcome: Progressing     Problem: Skin/Tissue Integrity  Goal: Skin integrity remains intact  Description: 1.  Monitor for areas of redness and/or skin breakdown2.  Assess vascular access sites hourly3.  Every 4-6 hours minimum:  Change oxygen saturation probe site4.  Every 4-6 hours:  If on nasal continuous positive airway pressure, respiratory therapy assess nares and determine need for appliance change or resting period  Outcome: Progressing     Problem: Safety - Adult  Goal: Free from fall injury  Outcome: Progressing     Problem: ABCDS Injury Assessment  Goal: Absence of physical injury  Outcome: Progressing     Problem: Nutrition Deficit:  Goal: Optimize nutritional status  Outcome: Progressing     Problem: Chronic Conditions and Co-morbidities  Goal: Patient's chronic conditions and co-morbidity symptoms are monitored and maintained or improved  Outcome: Progressing

## 2025-05-10 LAB
ANION GAP SERPL CALCULATED.3IONS-SCNC: 14 MMOL/L (ref 7–16)
BUN SERPL-MCNC: 20 MG/DL (ref 8–23)
CALCIUM SERPL-MCNC: 9.1 MG/DL (ref 8.8–10.2)
CHLORIDE SERPL-SCNC: 103 MMOL/L (ref 98–107)
CO2 SERPL-SCNC: 20 MMOL/L (ref 22–29)
CREAT SERPL-MCNC: 0.8 MG/DL (ref 0.7–1.2)
GFR, ESTIMATED: >90 ML/MIN/1.73M2
GLUCOSE BLD-MCNC: 103 MG/DL (ref 74–99)
GLUCOSE BLD-MCNC: 111 MG/DL (ref 74–99)
GLUCOSE BLD-MCNC: 112 MG/DL (ref 74–99)
GLUCOSE SERPL-MCNC: 107 MG/DL (ref 74–99)
MAGNESIUM SERPL-MCNC: 2 MG/DL (ref 1.6–2.4)
PARTIAL THROMBOPLASTIN TIME: 36.2 SEC (ref 24.5–35.1)
POTASSIUM SERPL-SCNC: 4.1 MMOL/L (ref 3.5–5.1)
SODIUM SERPL-SCNC: 137 MMOL/L (ref 136–145)

## 2025-05-10 PROCEDURE — 83735 ASSAY OF MAGNESIUM: CPT

## 2025-05-10 PROCEDURE — 2140000000 HC CCU INTERMEDIATE R&B

## 2025-05-10 PROCEDURE — 6370000000 HC RX 637 (ALT 250 FOR IP)

## 2025-05-10 PROCEDURE — 85730 THROMBOPLASTIN TIME PARTIAL: CPT

## 2025-05-10 PROCEDURE — 6370000000 HC RX 637 (ALT 250 FOR IP): Performed by: FAMILY MEDICINE

## 2025-05-10 PROCEDURE — 80048 BASIC METABOLIC PNL TOTAL CA: CPT

## 2025-05-10 PROCEDURE — 82962 GLUCOSE BLOOD TEST: CPT

## 2025-05-10 PROCEDURE — 36415 COLL VENOUS BLD VENIPUNCTURE: CPT

## 2025-05-10 PROCEDURE — 2500000003 HC RX 250 WO HCPCS: Performed by: NURSE PRACTITIONER

## 2025-05-10 PROCEDURE — 6370000000 HC RX 637 (ALT 250 FOR IP): Performed by: NURSE PRACTITIONER

## 2025-05-10 RX ADMIN — APIXABAN 5 MG: 5 TABLET, FILM COATED ORAL at 20:04

## 2025-05-10 RX ADMIN — SODIUM CHLORIDE, PRESERVATIVE FREE 10 ML: 5 INJECTION INTRAVENOUS at 09:17

## 2025-05-10 RX ADMIN — ATORVASTATIN CALCIUM 80 MG: 80 TABLET, FILM COATED ORAL at 20:04

## 2025-05-10 RX ADMIN — SODIUM CHLORIDE, PRESERVATIVE FREE 10 ML: 5 INJECTION INTRAVENOUS at 20:04

## 2025-05-10 RX ADMIN — LISINOPRIL 20 MG: 20 TABLET ORAL at 09:15

## 2025-05-10 RX ADMIN — METOPROLOL SUCCINATE 25 MG: 25 TABLET, FILM COATED, EXTENDED RELEASE ORAL at 09:16

## 2025-05-10 RX ADMIN — ASPIRIN 81 MG CHEWABLE TABLET 81 MG: 81 TABLET CHEWABLE at 09:15

## 2025-05-10 RX ADMIN — APIXABAN 5 MG: 5 TABLET, FILM COATED ORAL at 09:16

## 2025-05-10 NOTE — PLAN OF CARE
Problem: Discharge Planning  Goal: Discharge to home or other facility with appropriate resources  5/9/2025 2005 by Breanne Contreras RN  Outcome: Progressing  5/9/2025 1939 by Jessica Khan RN  Outcome: Progressing     Problem: Skin/Tissue Integrity  Goal: Skin integrity remains intact  Description: 1.  Monitor for areas of redness and/or skin breakdown2.  Assess vascular access sites hourly3.  Every 4-6 hours minimum:  Change oxygen saturation probe site4.  Every 4-6 hours:  If on nasal continuous positive airway pressure, respiratory therapy assess nares and determine need for appliance change or resting period  5/9/2025 2005 by Breanne Contreras RN  Outcome: Progressing  5/9/2025 1939 by Jessica Khan RN  Outcome: Progressing  Flowsheets (Taken 5/9/2025 0800 by Breanne Contreras, RN)  Skin Integrity Remains Intact: Monitor for areas of redness and/or skin breakdown     Problem: Safety - Adult  Goal: Free from fall injury  5/9/2025 2005 by Breanne Contreras RN  Outcome: Progressing  5/9/2025 1939 by Jessica Khan, RN  Outcome: Progressing     Problem: ABCDS Injury Assessment  Goal: Absence of physical injury  5/9/2025 2005 by Breanne Contreras RN  Outcome: Progressing  5/9/2025 1939 by Jessica Khan RN  Outcome: Progressing     Problem: Nutrition Deficit:  Goal: Optimize nutritional status  5/9/2025 2005 by Breanne Contreras RN  Outcome: Progressing  5/9/2025 1939 by Jessica Khan, RN  Outcome: Progressing     Problem: Chronic Conditions and Co-morbidities  Goal: Patient's chronic conditions and co-morbidity symptoms are monitored and maintained or improved  5/9/2025 2005 by Breanne Contreras RN  Outcome: Progressing  5/9/2025 1939 by Jessica Khan, RN  Outcome: Progressing

## 2025-05-10 NOTE — PLAN OF CARE
Problem: Discharge Planning  Goal: Discharge to home or other facility with appropriate resources  5/10/2025 1933 by Jessica Khan, RN  Outcome: Progressing  5/10/2025 1323 by Breanne Contreras RN  Outcome: Progressing     Problem: Skin/Tissue Integrity  Goal: Skin integrity remains intact  Description: 1.  Monitor for areas of redness and/or skin breakdown2.  Assess vascular access sites hourly3.  Every 4-6 hours minimum:  Change oxygen saturation probe site4.  Every 4-6 hours:  If on nasal continuous positive airway pressure, respiratory therapy assess nares and determine need for appliance change or resting period  5/10/2025 1933 by Jessica Khan, RN  Outcome: Progressing  Flowsheets (Taken 5/10/2025 1930)  Skin Integrity Remains Intact: Monitor for areas of redness and/or skin breakdown  5/10/2025 1323 by Breanne Contreras RN  Outcome: Progressing     Problem: Safety - Adult  Goal: Free from fall injury  5/10/2025 1933 by Jessica Khan, RN  Outcome: Progressing  5/10/2025 1323 by Breanne Contreras RN  Outcome: Progressing     Problem: ABCDS Injury Assessment  Goal: Absence of physical injury  5/10/2025 1933 by Jessica Khan RN  Outcome: Progressing  5/10/2025 1323 by Breanne Contreras RN  Outcome: Progressing     Problem: Chronic Conditions and Co-morbidities  Goal: Patient's chronic conditions and co-morbidity symptoms are monitored and maintained or improved  5/10/2025 1933 by Jessica Khan, RN  Outcome: Progressing  5/10/2025 1323 by Breanne Contreras RN  Outcome: Progressing     Problem: Nutrition Deficit:  Goal: Optimize nutritional status  5/10/2025 1933 by Jessica Khan, RN  Outcome: Progressing  5/10/2025 1323 by Breanne Contreras RN  Outcome: Progressing

## 2025-05-11 ENCOUNTER — APPOINTMENT (OUTPATIENT)
Dept: GENERAL RADIOLOGY | Age: 65
DRG: 065 | End: 2025-05-11
Attending: INTERNAL MEDICINE
Payer: MEDICARE

## 2025-05-11 LAB
GLUCOSE BLD-MCNC: 100 MG/DL (ref 74–99)
GLUCOSE BLD-MCNC: 129 MG/DL (ref 74–99)
GLUCOSE BLD-MCNC: 145 MG/DL (ref 74–99)

## 2025-05-11 PROCEDURE — 97530 THERAPEUTIC ACTIVITIES: CPT

## 2025-05-11 PROCEDURE — 6370000000 HC RX 637 (ALT 250 FOR IP): Performed by: FAMILY MEDICINE

## 2025-05-11 PROCEDURE — 82962 GLUCOSE BLOOD TEST: CPT

## 2025-05-11 PROCEDURE — 6370000000 HC RX 637 (ALT 250 FOR IP): Performed by: NURSE PRACTITIONER

## 2025-05-11 PROCEDURE — 6370000000 HC RX 637 (ALT 250 FOR IP)

## 2025-05-11 PROCEDURE — 2140000000 HC CCU INTERMEDIATE R&B

## 2025-05-11 PROCEDURE — 73010 X-RAY EXAM OF SHOULDER BLADE: CPT

## 2025-05-11 PROCEDURE — 97535 SELF CARE MNGMENT TRAINING: CPT

## 2025-05-11 PROCEDURE — 2500000003 HC RX 250 WO HCPCS: Performed by: NURSE PRACTITIONER

## 2025-05-11 PROCEDURE — 73030 X-RAY EXAM OF SHOULDER: CPT

## 2025-05-11 RX ORDER — ACETAMINOPHEN 500 MG
1000 TABLET ORAL EVERY 6 HOURS PRN
Status: DISCONTINUED | OUTPATIENT
Start: 2025-05-11 | End: 2025-05-14 | Stop reason: HOSPADM

## 2025-05-11 RX ADMIN — ASPIRIN 81 MG CHEWABLE TABLET 81 MG: 81 TABLET CHEWABLE at 11:13

## 2025-05-11 RX ADMIN — LISINOPRIL 20 MG: 20 TABLET ORAL at 11:13

## 2025-05-11 RX ADMIN — SODIUM CHLORIDE, PRESERVATIVE FREE 10 ML: 5 INJECTION INTRAVENOUS at 20:24

## 2025-05-11 RX ADMIN — APIXABAN 5 MG: 5 TABLET, FILM COATED ORAL at 11:13

## 2025-05-11 RX ADMIN — METOPROLOL SUCCINATE 25 MG: 25 TABLET, FILM COATED, EXTENDED RELEASE ORAL at 11:13

## 2025-05-11 RX ADMIN — ACETAMINOPHEN 1000 MG: 500 TABLET ORAL at 20:23

## 2025-05-11 RX ADMIN — SODIUM CHLORIDE, PRESERVATIVE FREE 10 ML: 5 INJECTION INTRAVENOUS at 11:24

## 2025-05-11 RX ADMIN — APIXABAN 5 MG: 5 TABLET, FILM COATED ORAL at 20:23

## 2025-05-11 RX ADMIN — ATORVASTATIN CALCIUM 80 MG: 80 TABLET, FILM COATED ORAL at 20:23

## 2025-05-11 ASSESSMENT — PAIN DESCRIPTION - DESCRIPTORS: DESCRIPTORS: SHARP;DISCOMFORT;NAGGING

## 2025-05-11 ASSESSMENT — PAIN DESCRIPTION - LOCATION: LOCATION: NECK;SHOULDER

## 2025-05-11 ASSESSMENT — PAIN - FUNCTIONAL ASSESSMENT: PAIN_FUNCTIONAL_ASSESSMENT: PREVENTS OR INTERFERES SOME ACTIVE ACTIVITIES AND ADLS

## 2025-05-11 ASSESSMENT — PAIN SCALES - GENERAL
PAINLEVEL_OUTOF10: 5
PAINLEVEL_OUTOF10: 0

## 2025-05-11 ASSESSMENT — PAIN DESCRIPTION - ORIENTATION: ORIENTATION: RIGHT

## 2025-05-12 LAB
GLUCOSE BLD-MCNC: 106 MG/DL (ref 74–99)
GLUCOSE BLD-MCNC: 116 MG/DL (ref 74–99)
GLUCOSE BLD-MCNC: 139 MG/DL (ref 74–99)
GLUCOSE BLD-MCNC: 160 MG/DL (ref 74–99)

## 2025-05-12 PROCEDURE — 82962 GLUCOSE BLOOD TEST: CPT

## 2025-05-12 PROCEDURE — 2500000003 HC RX 250 WO HCPCS: Performed by: NURSE PRACTITIONER

## 2025-05-12 PROCEDURE — 6370000000 HC RX 637 (ALT 250 FOR IP): Performed by: NURSE PRACTITIONER

## 2025-05-12 PROCEDURE — 92526 ORAL FUNCTION THERAPY: CPT

## 2025-05-12 PROCEDURE — 6370000000 HC RX 637 (ALT 250 FOR IP)

## 2025-05-12 PROCEDURE — 6370000000 HC RX 637 (ALT 250 FOR IP): Performed by: FAMILY MEDICINE

## 2025-05-12 PROCEDURE — 2140000000 HC CCU INTERMEDIATE R&B

## 2025-05-12 RX ADMIN — SODIUM CHLORIDE, PRESERVATIVE FREE 10 ML: 5 INJECTION INTRAVENOUS at 07:49

## 2025-05-12 RX ADMIN — SODIUM CHLORIDE, PRESERVATIVE FREE 10 ML: 5 INJECTION INTRAVENOUS at 19:51

## 2025-05-12 RX ADMIN — ASPIRIN 81 MG CHEWABLE TABLET 81 MG: 81 TABLET CHEWABLE at 07:46

## 2025-05-12 RX ADMIN — APIXABAN 5 MG: 5 TABLET, FILM COATED ORAL at 19:51

## 2025-05-12 RX ADMIN — LISINOPRIL 20 MG: 20 TABLET ORAL at 07:46

## 2025-05-12 RX ADMIN — METOPROLOL SUCCINATE 25 MG: 25 TABLET, FILM COATED, EXTENDED RELEASE ORAL at 07:46

## 2025-05-12 RX ADMIN — ATORVASTATIN CALCIUM 80 MG: 80 TABLET, FILM COATED ORAL at 19:51

## 2025-05-12 RX ADMIN — APIXABAN 5 MG: 5 TABLET, FILM COATED ORAL at 07:46

## 2025-05-12 NOTE — PLAN OF CARE
Problem: Discharge Planning  Goal: Discharge to home or other facility with appropriate resources  5/11/2025 2031 by Marc Min RN  Outcome: Progressing  5/11/2025 1515 by Breanne Contreras RN  Outcome: Progressing     Problem: Skin/Tissue Integrity  Goal: Skin integrity remains intact  Description: 1.  Monitor for areas of redness and/or skin breakdown2.  Assess vascular access sites hourly3.  Every 4-6 hours minimum:  Change oxygen saturation probe site4.  Every 4-6 hours:  If on nasal continuous positive airway pressure, respiratory therapy assess nares and determine need for appliance change or resting period  5/11/2025 2031 by Marc Min RN  Outcome: Progressing  5/11/2025 1515 by Breanne Contreras RN  Outcome: Progressing     Problem: Safety - Adult  Goal: Free from fall injury  5/11/2025 2031 by Marc Min RN  Outcome: Progressing  5/11/2025 1515 by Breanne Contreras RN  Outcome: Progressing     Problem: ABCDS Injury Assessment  Goal: Absence of physical injury  5/11/2025 2031 by Marc Min RN  Outcome: Progressing  5/11/2025 1515 by Breanne Contreras RN  Outcome: Progressing     Problem: Nutrition Deficit:  Goal: Optimize nutritional status  5/11/2025 2031 by Marc Min RN  Outcome: Progressing  5/11/2025 1515 by Breanne Contreras RN  Outcome: Progressing     Problem: Chronic Conditions and Co-morbidities  Goal: Patient's chronic conditions and co-morbidity symptoms are monitored and maintained or improved  5/11/2025 2031 by Marc Min RN  Outcome: Progressing  5/11/2025 1515 by Breanne Contreras RN  Outcome: Progressing

## 2025-05-12 NOTE — PATIENT CARE CONFERENCE
P Quality Flow/Interdisciplinary Rounds Progress Note        Quality Flow Rounds held on May 12, 2025    Disciplines Attending:  Bedside Nurse, , , and Nursing Unit Leadership    Esteban Terry was admitted on 5/6/2025  5:49 PM    Anticipated Discharge Date:       Disposition:    Gennaro Score:  Gennaro Scale Score: 16    [facility] RISK OF UNPLANNED READMISSION 2.0             14.8 Total Score        Discussed patient goal for the day, patient clinical progression, and barriers to discharge.  The following Goal(s) of the Day/Commitment(s) have been identified:  Labs - Report Results        Allison Pino RN  May 12, 2025

## 2025-05-12 NOTE — CARE COORDINATION
5/12/25  Transition of Care update.  Pre-cert still pending for ARU [facility]. PT/OT updates reveal AM-PAC of 13/24 for PT as well as OT. Patient continues on a Dysphagia diet.  Discharge goal is ARU at [facility] vs [facility] stay at [place] pending insurance approval. Pre-cert will need obtained prior to discharge. Transport started and pending in Epic. BROOKE/ARASH to follow.     Electronically signed by RICHARD Saavedra on 5/12/2025 at 9:55 AM

## 2025-05-13 LAB
GLUCOSE BLD-MCNC: 115 MG/DL (ref 74–99)
GLUCOSE BLD-MCNC: 119 MG/DL (ref 74–99)
GLUCOSE BLD-MCNC: 130 MG/DL (ref 74–99)
GLUCOSE BLD-MCNC: 142 MG/DL (ref 74–99)

## 2025-05-13 PROCEDURE — 97530 THERAPEUTIC ACTIVITIES: CPT

## 2025-05-13 PROCEDURE — 92526 ORAL FUNCTION THERAPY: CPT

## 2025-05-13 PROCEDURE — 6370000000 HC RX 637 (ALT 250 FOR IP): Performed by: FAMILY MEDICINE

## 2025-05-13 PROCEDURE — 6370000000 HC RX 637 (ALT 250 FOR IP)

## 2025-05-13 PROCEDURE — 2500000003 HC RX 250 WO HCPCS: Performed by: NURSE PRACTITIONER

## 2025-05-13 PROCEDURE — 97535 SELF CARE MNGMENT TRAINING: CPT

## 2025-05-13 PROCEDURE — 82962 GLUCOSE BLOOD TEST: CPT

## 2025-05-13 PROCEDURE — 2140000000 HC CCU INTERMEDIATE R&B

## 2025-05-13 PROCEDURE — 6370000000 HC RX 637 (ALT 250 FOR IP): Performed by: NURSE PRACTITIONER

## 2025-05-13 RX ADMIN — LISINOPRIL 20 MG: 20 TABLET ORAL at 08:29

## 2025-05-13 RX ADMIN — METOPROLOL SUCCINATE 25 MG: 25 TABLET, FILM COATED, EXTENDED RELEASE ORAL at 08:28

## 2025-05-13 RX ADMIN — ATORVASTATIN CALCIUM 80 MG: 80 TABLET, FILM COATED ORAL at 20:15

## 2025-05-13 RX ADMIN — APIXABAN 5 MG: 5 TABLET, FILM COATED ORAL at 20:15

## 2025-05-13 RX ADMIN — SODIUM CHLORIDE, PRESERVATIVE FREE 10 ML: 5 INJECTION INTRAVENOUS at 08:29

## 2025-05-13 RX ADMIN — SODIUM CHLORIDE, PRESERVATIVE FREE 10 ML: 5 INJECTION INTRAVENOUS at 20:15

## 2025-05-13 RX ADMIN — APIXABAN 5 MG: 5 TABLET, FILM COATED ORAL at 08:29

## 2025-05-13 RX ADMIN — ASPIRIN 81 MG CHEWABLE TABLET 81 MG: 81 TABLET CHEWABLE at 08:29

## 2025-05-13 NOTE — CARE COORDINATION
5/13/25  Transition of Care update.  Patient being followed for acute CVA.  Pre-cert is still pending for ARU at [facility]. Updated therapy evaluations to be requested. Patient continues on a Dysphagia/ minced  and moist diet and tolerating well. IF ARU is not approved back up plan will be a SISSY stay at [place]. SW/ARASH to follow.    1:30pm  Pre-cert obtained for [facility] ARU.  Patient noted to have a new order for speech eval to assess dysphagia with liquids. Should patient be able to discharge today [facility] with ARU will set up transport. Nursing calling to expedite swallow eval.    Electronically signed by RICHARD Saavedra on 5/13/2025 at 10:47 AM

## 2025-05-13 NOTE — CONSULTS
[facility]  Neuro Inpatient Consult        Esteban Terry is a 64 y.o. left handed man    Neurology was consulted for strokelike symptoms    Past Medical History:     Past Medical History:   Diagnosis Date    Heartburn     Hyperlipidemia     Hypertension     Polyp of colon     Smokers' cough (HCC)      Past Surgical History:     Past Surgical History:   Procedure Laterality Date    APPENDECTOMY      COLONOSCOPY      COLONOSCOPY N/A 3/16/2020    COLORECTAL CANCER SCREENING, NOT HIGH RISK performed by Niall Calvert III, MD at [facility] ENDOSCOPY    HERNIA REPAIR      SKIN CANCER EXCISION Left     SIDE OF FACE     Allergies:     Patient has no known allergies.    Medications:     None    Social History:     He lives at home with his girlfriend  He works as a   He is a current smoker of 1 pack/day since the age of 11; no history of alcohol abuse; he smokes marijuana daily    Review of Systems:     No chest pain or palpitations  No SOB  No vertigo, lightheadedness or loss of consciousness  No falls, tripping or stumbling  No incontinence of bowels or bladder  No itching or bruising appreciated  + Left-sided weakness  + Slurred speech    ROS is otherwise negative     Family History:     Family History   Problem Relation Age of Onset    Breast Cancer Mother     Cancer Mother     High Blood Pressure Father     Heart Disease Father     Heart Attack Father     Prostate Cancer Father     Prostate Cancer Sister     Other Sister       History of Present Illness:     The patient presented on 5/6 with left facial droop and left-sided weakness beginning the day prior.    He is sitting up in the chair and is a fairly good historian with an additional significant past medical history of smoking, HLD, HTN    2 days ago he started developing a left facial droop and left-sided weakness.  Apparently he was belligerent at home and declined coming to the ER.  He was having difficulty 
Department of Orthopedic Surgery  Resident Consult Note          Reason for Consult:  right shoulder finding    HISTORY OF PRESENT ILLNESS:      Patient is a 64-year-old male who presents with incidental finding of the left humeral head.  Patient initially came to the hospital because of motor deficits to the left side of his body.  Patient has a history of left shoulder plain that intermittently comes and goes.  History shows an incidental finding of an enchondroma on MRI done over 5 years ago.  He currently denies any new onset numbness, tingling, or paresthesias.  Denies any other orthopedic complaints at this time.      Past Medical History:        Diagnosis Date    Heartburn     Hyperlipidemia     Hypertension     Polyp of colon     Smokers' cough (HCC)      Past Surgical History:        Procedure Laterality Date    APPENDECTOMY      COLONOSCOPY      COLONOSCOPY N/A 3/16/2020    COLORECTAL CANCER SCREENING, NOT HIGH RISK performed by Niall Calvert III, MD at [facility] ENDOSCOPY    HERNIA REPAIR      SKIN CANCER EXCISION Left     SIDE OF FACE     Current Medications:   Current Facility-Administered Medications: acetaminophen (TYLENOL) tablet 1,000 mg, 1,000 mg, Oral, Q6H PRN  lidocaine 4 % external patch 1 patch, 1 patch, TransDERmal, Daily  atorvastatin (LIPITOR) tablet 80 mg, 80 mg, Oral, Daily  apixaban (ELIQUIS) tablet 5 mg, 5 mg, Oral, BID  insulin lispro (HUMALOG,ADMELOG) injection vial 0-4 Units, 0-4 Units, SubCUTAneous, 4x Daily AC & HS  glucose chewable tablet 16 g, 4 tablet, Oral, PRN  dextrose bolus 10% 125 mL, 125 mL, IntraVENous, PRN **OR** dextrose bolus 10% 250 mL, 250 mL, IntraVENous, PRN  glucagon injection 1 mg, 1 mg, SubCUTAneous, PRN  dextrose 10 % infusion, , IntraVENous, Continuous PRN  sulfur hexafluoride microspheres (LUMASON) 60.7-25 MG injection 2 mL, 2 mL, IntraVENous, ONCE PRN  metoprolol succinate (TOPROL XL) extended release tablet 25 mg, 25 mg, Oral, Daily  lisinopril 
years  Marijuana abuse-smokes marijuana daily  GERD  History of colon polyps -colonoscopy 3/2020 normal  Mallet finger of right hand         Past Surgical History:    Past Surgical History:   Procedure Laterality Date    APPENDECTOMY      COLONOSCOPY      COLONOSCOPY N/A 3/16/2020    COLORECTAL CANCER SCREENING, NOT HIGH RISK performed by Niall Calvert III, MD at [facility] ENDOSCOPY    HERNIA REPAIR      SKIN CANCER EXCISION Left     SIDE OF FACE       Medications Prior to admit:  Prior to Admission medications    Not on File       Current Medications:    Current Facility-Administered Medications: atorvastatin (LIPITOR) tablet 20 mg, 20 mg, Oral, Daily  insulin lispro (HUMALOG,ADMELOG) injection vial 0-4 Units, 0-4 Units, SubCUTAneous, 4x Daily AC & HS  glucose chewable tablet 16 g, 4 tablet, Oral, PRN  dextrose bolus 10% 125 mL, 125 mL, IntraVENous, PRN **OR** dextrose bolus 10% 250 mL, 250 mL, IntraVENous, PRN  glucagon injection 1 mg, 1 mg, SubCUTAneous, PRN  dextrose 10 % infusion, , IntraVENous, Continuous PRN  sulfur hexafluoride microspheres (LUMASON) 60.7-25 MG injection 2 mL, 2 mL, IntraVENous, ONCE PRN  metoprolol succinate (TOPROL XL) extended release tablet 25 mg, 25 mg, Oral, Daily  heparin (porcine) injection 3,900 Units, 60 Units/kg, IntraVENous, PRN  heparin (porcine) injection 2,000 Units, 30 Units/kg, IntraVENous, PRN  heparin 25,000 units in dextrose 5% 250 mL (premix) infusion, 5-30 Units/kg/hr, IntraVENous, Continuous  lisinopril (PRINIVIL;ZESTRIL) tablet 20 mg, 20 mg, Oral, Daily  sodium chloride flush 0.9 % injection 5-40 mL, 5-40 mL, IntraVENous, 2 times per day  sodium chloride flush 0.9 % injection 5-40 mL, 5-40 mL, IntraVENous, PRN  0.9 % sodium chloride infusion, , IntraVENous, PRN  ondansetron (ZOFRAN-ODT) disintegrating tablet 4 mg, 4 mg, Oral, Q8H PRN **OR** ondansetron (ZOFRAN) injection 4 mg, 4 mg, IntraVENous, Q6H PRN  polyethylene glycol (GLYCOLAX) packet 17 g, 17 g, Oral,

## 2025-05-13 NOTE — PLAN OF CARE
Problem: Discharge Planning  Goal: Discharge to home or other facility with appropriate resources  5/13/2025 1259 by Janie Joyner, RN  Outcome: Progressing     Problem: Skin/Tissue Integrity  Goal: Skin integrity remains intact  Description: 1.  Monitor for areas of redness and/or skin breakdown2.  Assess vascular access sites hourly3.  Every 4-6 hours minimum:  Change oxygen saturation probe site4.  Every 4-6 hours:  If on nasal continuous positive airway pressure, respiratory therapy assess nares and determine need for appliance change or resting period  5/13/2025 1259 by Janie Joyner, RN  Outcome: Progressing     Problem: Safety - Adult  Goal: Free from fall injury  5/13/2025 1259 by Janie Joyner, RN  Outcome: Progressing

## 2025-05-13 NOTE — PATIENT CARE CONFERENCE
P Quality Flow/Interdisciplinary Rounds Progress Note        Quality Flow Rounds held on May 13, 2025    Disciplines Attending:  Bedside Nurse, , , and Nursing Unit Leadership    Esteban Terry was admitted on 5/6/2025  5:49 PM    Anticipated Discharge Date:       Disposition:    Gennaro Score:  Gennaro Scale Score: 15    [facility] RISK OF UNPLANNED READMISSION 2.0             14.9 Total Score        Discussed patient goal for the day, patient clinical progression, and barriers to discharge.  The following Goal(s) of the Day/Commitment(s) have been identified:  discharge planning       Janeth Desouza RN  May 13, 2025

## 2025-05-14 ENCOUNTER — OUTSIDE SERVICES (OUTPATIENT)
Dept: PHYSICAL MEDICINE AND REHAB | Age: 65
End: 2025-05-14

## 2025-05-14 ENCOUNTER — APPOINTMENT (OUTPATIENT)
Dept: GENERAL RADIOLOGY | Age: 65
DRG: 065 | End: 2025-05-14
Attending: INTERNAL MEDICINE
Payer: MEDICARE

## 2025-05-14 VITALS
HEART RATE: 72 BPM | WEIGHT: 145 LBS | OXYGEN SATURATION: 96 % | SYSTOLIC BLOOD PRESSURE: 133 MMHG | BODY MASS INDEX: 23.3 KG/M2 | DIASTOLIC BLOOD PRESSURE: 79 MMHG | HEIGHT: 66 IN | TEMPERATURE: 97.8 F | RESPIRATION RATE: 18 BRPM

## 2025-05-14 DIAGNOSIS — I48.91 NEW ONSET A-FIB (HCC): Primary | ICD-10-CM

## 2025-05-14 LAB
ANION GAP SERPL CALCULATED.3IONS-SCNC: 12 MMOL/L (ref 7–16)
BUN SERPL-MCNC: 19 MG/DL (ref 8–23)
CALCIUM SERPL-MCNC: 9 MG/DL (ref 8.8–10.2)
CHLORIDE SERPL-SCNC: 98 MMOL/L (ref 98–107)
CO2 SERPL-SCNC: 21 MMOL/L (ref 22–29)
CREAT SERPL-MCNC: 0.7 MG/DL (ref 0.7–1.2)
ERYTHROCYTE [DISTWIDTH] IN BLOOD BY AUTOMATED COUNT: 12.5 % (ref 11.5–15)
GFR, ESTIMATED: >90 ML/MIN/1.73M2
GLUCOSE BLD-MCNC: 103 MG/DL (ref 74–99)
GLUCOSE BLD-MCNC: 130 MG/DL (ref 74–99)
GLUCOSE SERPL-MCNC: 104 MG/DL (ref 74–99)
HCT VFR BLD AUTO: 46.3 % (ref 37–54)
HGB BLD-MCNC: 16.5 G/DL (ref 12.5–16.5)
MAGNESIUM SERPL-MCNC: 2.1 MG/DL (ref 1.6–2.4)
MCH RBC QN AUTO: 30.4 PG (ref 26–35)
MCHC RBC AUTO-ENTMCNC: 35.6 G/DL (ref 32–34.5)
MCV RBC AUTO: 85.4 FL (ref 80–99.9)
PLATELET # BLD AUTO: 218 K/UL (ref 130–450)
PMV BLD AUTO: 11.7 FL (ref 7–12)
POTASSIUM SERPL-SCNC: 4.1 MMOL/L (ref 3.5–5.1)
RBC # BLD AUTO: 5.42 M/UL (ref 3.8–5.8)
SODIUM SERPL-SCNC: 131 MMOL/L (ref 136–145)
WBC OTHER # BLD: 7.4 K/UL (ref 4.5–11.5)

## 2025-05-14 PROCEDURE — 97530 THERAPEUTIC ACTIVITIES: CPT

## 2025-05-14 PROCEDURE — 2500000003 HC RX 250 WO HCPCS: Performed by: NURSE PRACTITIONER

## 2025-05-14 PROCEDURE — 6370000000 HC RX 637 (ALT 250 FOR IP): Performed by: NURSE PRACTITIONER

## 2025-05-14 PROCEDURE — 92610 EVALUATE SWALLOWING FUNCTION: CPT

## 2025-05-14 PROCEDURE — 6370000000 HC RX 637 (ALT 250 FOR IP): Performed by: FAMILY MEDICINE

## 2025-05-14 PROCEDURE — 2500000003 HC RX 250 WO HCPCS: Performed by: PHYSICIAN ASSISTANT

## 2025-05-14 PROCEDURE — 80048 BASIC METABOLIC PNL TOTAL CA: CPT

## 2025-05-14 PROCEDURE — 82962 GLUCOSE BLOOD TEST: CPT

## 2025-05-14 PROCEDURE — 83735 ASSAY OF MAGNESIUM: CPT

## 2025-05-14 PROCEDURE — 6370000000 HC RX 637 (ALT 250 FOR IP)

## 2025-05-14 PROCEDURE — 36415 COLL VENOUS BLD VENIPUNCTURE: CPT

## 2025-05-14 PROCEDURE — 74230 X-RAY XM SWLNG FUNCJ C+: CPT

## 2025-05-14 PROCEDURE — 85027 COMPLETE CBC AUTOMATED: CPT

## 2025-05-14 PROCEDURE — 92526 ORAL FUNCTION THERAPY: CPT

## 2025-05-14 RX ORDER — LIDOCAINE 4 G/G
1 PATCH TOPICAL DAILY
Qty: 7 PATCH | Refills: 0 | Status: SHIPPED | OUTPATIENT
Start: 2025-05-15

## 2025-05-14 RX ORDER — ATORVASTATIN CALCIUM 80 MG/1
80 TABLET, FILM COATED ORAL DAILY
Qty: 30 TABLET | Refills: 3 | Status: SHIPPED | OUTPATIENT
Start: 2025-05-14

## 2025-05-14 RX ORDER — POLYETHYLENE GLYCOL 3350 17 G/17G
17 POWDER, FOR SOLUTION ORAL DAILY PRN
Qty: 30 PACKET | Refills: 0 | Status: SHIPPED | OUTPATIENT
Start: 2025-05-14 | End: 2025-06-13

## 2025-05-14 RX ORDER — METOPROLOL SUCCINATE 25 MG/1
25 TABLET, EXTENDED RELEASE ORAL DAILY
Qty: 30 TABLET | Refills: 3 | Status: SHIPPED | OUTPATIENT
Start: 2025-05-15

## 2025-05-14 RX ORDER — ASPIRIN 81 MG/1
81 TABLET, CHEWABLE ORAL DAILY
Qty: 30 TABLET | Refills: 3 | Status: SHIPPED | OUTPATIENT
Start: 2025-05-15

## 2025-05-14 RX ADMIN — BARIUM SULFATE 15 ML: 400 SUSPENSION ORAL at 10:24

## 2025-05-14 RX ADMIN — ASPIRIN 81 MG CHEWABLE TABLET 81 MG: 81 TABLET CHEWABLE at 09:05

## 2025-05-14 RX ADMIN — BARIUM SULFATE 15 ML: 0.81 POWDER, FOR SUSPENSION ORAL at 10:24

## 2025-05-14 RX ADMIN — LISINOPRIL 20 MG: 20 TABLET ORAL at 09:05

## 2025-05-14 RX ADMIN — SODIUM CHLORIDE, PRESERVATIVE FREE 10 ML: 5 INJECTION INTRAVENOUS at 09:06

## 2025-05-14 RX ADMIN — BARIUM SULFATE 15 ML: 400 PASTE ORAL at 10:23

## 2025-05-14 RX ADMIN — APIXABAN 5 MG: 5 TABLET, FILM COATED ORAL at 09:04

## 2025-05-14 RX ADMIN — METOPROLOL SUCCINATE 25 MG: 25 TABLET, FILM COATED, EXTENDED RELEASE ORAL at 09:05

## 2025-05-14 NOTE — DISCHARGE SUMMARY
internal carotid arteries. 3. No definable compromise in the vertebrobasilar arterial circulation. 4. Evidence of focal infarctions or lacunar infarctions in bilateral lentiform nucleus, bilateral caudate nuclei, left thalamus, anterior portion of bilateral internal capsules and subcortical areas of white matter in left supra sylvian location. Follow-up evaluation with MRI of brain recommended.     MRI BRAIN WO CONTRAST  Result Date: 5/6/2025  EXAMINATION: MRI OF THE BRAIN WITHOUT CONTRAST  5/6/2025 11:21 am TECHNIQUE: Multiplanar multisequence MRI of the brain was performed without the administration of intravenous contrast. COMPARISON: CT head performed 05/06/2025. HISTORY: ORDERING SYSTEM PROVIDED HISTORY: r/o cva TECHNOLOGIST PROVIDED HISTORY: Reason for exam:->r/o cva Decision Support Exception - unselect if not a suspected or confirmed emergency medical condition->Emergency Medical Condition (MA) FINDINGS: INTRACRANIAL STRUCTURES/VENTRICLES: The sellar and suprasellar structures, optic chiasm, corpus callosum, pineal gland, tectum, and midline brainstem structures are unremarkable.  The craniocervical junction is unremarkable. There is no acute hemorrhage, mass effect, or midline shift.  There is satisfactory overall gray-white matter differentiation.  There is chronic microvascular disease.  The ventricular structures are symmetric and unremarkable.  The infratentorial structures including the cerebellopontine angles and internal auditory canals are unremarkable.  There is no abnormal restricted diffusion.  There is no abnormal blooming artifact on susceptibility weighted imaging. ORBITS: The visualized portion of the orbits demonstrate no acute abnormality. SINUSES: Paranasal sinuses are normally aerated.  There is fluid in the mastoid air cells. BONES/SOFT TISSUES: The bone marrow signal intensity appears normal. The soft tissues demonstrate no acute abnormality.     Chronic microvascular disease without

## 2025-05-14 NOTE — PROGRESS NOTES
[facility]   Progress note      Subjective:    The patient is awake and alert.    No acute events overnight  Patient denies chest pain/shortness of breath    Objective:    /64   Pulse 72   Temp 98.3 °F (36.8 °C) (Oral)   Resp 18   Ht 1.676 m (5' 6\")   Wt 65.8 kg (145 lb)   SpO2 95%   BMI 23.40 kg/m²     In: 10 [I.V.:10]  Out: 900   In: 10   Out: 900 [Urine:900]    General appearance: NAD, conversant, slight slurred speech  HEENT: AT/NC, MMM-left facial droop, mouth droop  Neck: FROM, supple  Lungs: Clear to auscultation  CV: RRR, no MRGs  Vasc: Radial pulses 2+  Abdomen: Soft, non-tender; no masses or HSM  Extremities: No peripheral edema or digital cyanosis  Skin: no rash, lesions or ulcers  Psych: Alert and oriented to person, place and time  Neuro: Left-sided facial droop, left upper and lower extremity strength decreased 3/5  Recent Labs     05/07/25 1910 05/08/25  0315 05/09/25  0534   WBC 7.9 10.8 9.9   HGB 17.9* 17.4* 16.2   HCT 50.4 49.9 46.7    216 197       Recent Labs     05/07/25 1910 05/08/25  0315 05/09/25  0534    138 139   K 3.6 4.1 4.0    102 106   CO2 19* 19* 21*   BUN 21 18 22   CREATININE 0.8 0.9 0.9   CALCIUM 9.2 9.1 8.9       Assessment:    Principal Problem (Resolved):    Stroke-like symptoms  Active Problems:    Pre-diabetes    HLD (hyperlipidemia)    Moderate protein-calorie malnutrition    Acute ischemic stroke (HCC)    New onset a-fib (HCC)      Plan:    64-year-old man initially admitted for left-sided facial droop as well as left arm and leg weakness-acute stroke    RRT called overnight secondary to A-fib RVR  Above likely culprit of embolic stroke  Continue rate control  On IV heparin on my evaluation-this can be discontinued and transitioned  to Eliquis for stroke in setting of A-fib  PT OT evaluation with possibility of acute rehab  Blood pressure control-currently optimal  Hemoglobin A1c/-6.1-continue insulin sliding scale  lipid 
    [facility]   Progress note      Subjective:  Denies chest pain and dyspnea  Denies palpitations and feelings of his heart racing  Complains of left shoulder pain worse with passive ROM    Objective:  Sitting up in bed, eating breakfast, conversing without difficulty  No acute distress  No family present at the bedside  BP (!) 140/88   Pulse 76   Temp 97.8 °F (36.6 °C) (Temporal)   Resp 18   Ht 1.676 m (5' 6\")   Wt 65.8 kg (145 lb)   SpO2 94%   BMI 23.40 kg/m²   General appearance: NAD, conversant, slight slurred speech  HEENT: AT/NC, MMM-left facial droop, mouth droop  Neck: FROM, supple  Lungs: Clear to auscultation  CV: RRR, no MRGs  Vasc: Radial pulses 2+  Abdomen: Soft, non-tender; no masses or HSM  Extremities: No peripheral edema or digital cyanosis  Skin: no rash, lesions or ulcers  Psych: Alert and oriented to person, place and time  Neuro: Left-sided facial droop, left upper and lower extremity strength decreased 3/5  Recent Labs     05/09/25  0534   WBC 9.9   HGB 16.2   HCT 46.7          Recent Labs     05/09/25  0534 05/10/25  0635    137   K 4.0 4.1    103   CO2 21* 20*   BUN 22 20   CREATININE 0.9 0.8   CALCIUM 8.9 9.1       Assessment:    Principal Problem (Resolved):    Stroke-like symptoms  Active Problems:    Pre-diabetes    HLD (hyperlipidemia)    Moderate protein-calorie malnutrition    Acute ischemic stroke (HCC)    New onset a-fib (HCC)      Plan:    64-year-old man initially admitted for left-sided facial droop as well as left arm and leg weakness-acute stroke    RRT called overnight secondary to A-fib RVR  Above likely culprit of embolic stroke  Continue rate control  On IV heparin on my evaluation-this can be discontinued and transitioned  to Eliquis for stroke in setting of A-fib  PT OT evaluation with possibility of acute rehab  Blood pressure control-currently optimal  Hemoglobin A1c/-6.1-continue insulin sliding scale  lipid panel-markedly 
    [facility]   Progress note      Subjective:    The patient is awake and alert.    Sitting up in chair able to answer questions, slurred speech  RRT overnight for A-fib RVR    Objective:    BP (!) 109/59   Pulse 67   Temp 97 °F (36.1 °C) (Temporal)   Resp 16   Ht 1.676 m (5' 6\")   Wt 65.8 kg (145 lb)   SpO2 97%   BMI 23.40 kg/m²     In: 1140.3 [P.O.:240; I.V.:69]  Out: 600   In: 1140.3   Out: 600 [Urine:600]    General appearance: NAD, conversant but slurring of speech is noted  HEENT: AT/NC, MMM-left facial droop, mouth droop  Neck: FROM, supple  Lungs: Clear to auscultation  CV: RRR, no MRGs  Vasc: Radial pulses 2+  Abdomen: Soft, non-tender; no masses or HSM  Extremities: No peripheral edema or digital cyanosis  Skin: no rash, lesions or ulcers  Psych: Alert and oriented to person, place and time  Neuro: Left-sided facial droop, left upper and lower extremity strength decreased 3/5  Recent Labs     05/07/25  0533 05/07/25  1910 05/08/25  0315   WBC 7.0 7.9 10.8   HGB 17.2* 17.9* 17.4*   HCT 48.3 50.4 49.9    225 216       Recent Labs     05/07/25  0533 05/07/25  1910 05/08/25  0315    136 138   K 3.8 3.6 4.1    102 102   CO2 20* 19* 19*   BUN 16 21 18   CREATININE 0.8 0.8 0.9   CALCIUM 9.1 9.2 9.1       Assessment:    Principal Problem (Resolved):    Stroke-like symptoms  Active Problems:    Pre-diabetes    HLD (hyperlipidemia)    Moderate protein-calorie malnutrition    Acute ischemic stroke (HCC)    New onset a-fib (HCC)      Plan:    64-year-old man initially admitted for left-sided facial droop as well as left arm and leg weakness-acute stroke    RRT called overnight secondary to A-fib RVR  Above likely culprit of embolic stroke  Continue rate control  On IV heparin on my evaluation-this can be discontinued and transitioned  to Eliquis for stroke in setting of A-fib  PT OT evaluation with possibility of acute rehab  Blood pressure control-currently 
    INPATIENT CARDIOLOGY FOLLOW-UP    Name: Esteban Terry    Age: 64 y.o.    Date of Admission: 5/6/2025  5:49 PM    Date of Service: 5/9/2025        Chief Complaint: Follow-up for paroxysmal atrial fibrillation    Interim History:  No new overnight cardiac complaints. Currently with no complaints of CP, SOB, palpitations, dizziness, or lightheadedness. SR on telemetry.  No recurrence of atrial fibrillation    Review of Systems:   Negative except as described above    Problem List:  Patient Active Problem List   Diagnosis    Gastroesophageal reflux disease without esophagitis    Pre-diabetes    HLD (hyperlipidemia)    Moderate protein-calorie malnutrition    Acute ischemic stroke (HCC)    New onset a-fib (HCC)       Current Medications:    Current Facility-Administered Medications:     atorvastatin (LIPITOR) tablet 80 mg, 80 mg, Oral, Daily, Sugey Wolf APRN - CNP, 80 mg at 05/08/25 2108    apixaban (ELIQUIS) tablet 5 mg, 5 mg, Oral, BID, Sugey Wolf APRN - CNP, 5 mg at 05/08/25 1618    insulin lispro (HUMALOG,ADMELOG) injection vial 0-4 Units, 0-4 Units, SubCUTAneous, 4x Daily AC & HS, Dontae Graff, DO, 1 Units at 05/07/25 2054    glucose chewable tablet 16 g, 4 tablet, Oral, PRN, Dontae Graff,     dextrose bolus 10% 125 mL, 125 mL, IntraVENous, PRN **OR** dextrose bolus 10% 250 mL, 250 mL, IntraVENous, PRN, Dontae Graff,     glucagon injection 1 mg, 1 mg, SubCUTAneous, PRN, Dontae Graff,     dextrose 10 % infusion, , IntraVENous, Continuous PRN, Dontae Graff,     sulfur hexafluoride microspheres (LUMASON) 60.7-25 MG injection 2 mL, 2 mL, IntraVENous, ONCE PRN, Janny Porter MD    metoprolol succinate (TOPROL XL) extended release tablet 25 mg, 25 mg, Oral, Daily, Janny Porter MD, 25 mg at 05/08/25 0920    lisinopril (PRINIVIL;ZESTRIL) tablet 20 mg, 20 mg, Oral, Daily, Hayley Solitario APRN - CNP, 20 mg at 05/08/25 0920    sodium chloride flush 0.9 % 
    [facility]   Progress note      Subjective:  Denies chest pain and dyspnea  Denies palpitations and feelings of his heart racing    Objective:  Sitting up in a chair, conversing without difficulty  No acute distress  No family present at the bedside  /71   Pulse 89   Temp 97.9 °F (36.6 °C) (Infrared)   Resp 16   Ht 1.676 m (5' 6\")   Wt 65.8 kg (145 lb)   SpO2 96%   BMI 23.40 kg/m²   General appearance: NAD, conversant, slight slurred speech  HEENT: AT/NC, MMM-left facial droop, mouth droop  Neck: FROM, supple  Lungs: Clear to auscultation  CV: RRR, no MRGs  Vasc: Radial pulses 2+  Abdomen: Soft, non-tender; no masses or HSM  Extremities: No peripheral edema or digital cyanosis  Skin: no rash, lesions or ulcers  Psych: Alert and oriented to person, place and time  Neuro: Left-sided facial droop, left upper and lower extremity strength decreased 3/5  Recent Labs     05/07/25 1910 05/08/25  0315 05/09/25  0534   WBC 7.9 10.8 9.9   HGB 17.9* 17.4* 16.2   HCT 50.4 49.9 46.7    216 197       Recent Labs     05/07/25 1910 05/08/25  0315 05/09/25  0534    138 139   K 3.6 4.1 4.0    102 106   CO2 19* 19* 21*   BUN 21 18 22   CREATININE 0.8 0.9 0.9   CALCIUM 9.2 9.1 8.9       Assessment:    Principal Problem (Resolved):    Stroke-like symptoms  Active Problems:    Pre-diabetes    HLD (hyperlipidemia)    Moderate protein-calorie malnutrition    Acute ischemic stroke (HCC)    New onset a-fib (HCC)      Plan:    64-year-old man initially admitted for left-sided facial droop as well as left arm and leg weakness-acute stroke    RRT called overnight secondary to A-fib RVR  Above likely culprit of embolic stroke  Continue rate control  On IV heparin on my evaluation-this can be discontinued and transitioned  to Eliquis for stroke in setting of A-fib  PT OT evaluation with possibility of acute rehab  Blood pressure control-currently optimal  Hemoglobin A1c/-6.1-continue insulin 
    [facility]   Progress note      Subjective:  Denies chest pain and dyspnea  Denies palpitations and feelings of his heart racing  States left shoulder pain is much improved     Objective:  Sitting up in bed, eating breakfast, conversing without difficulty  No acute distress  No family present at the bedside  /70   Pulse 74   Temp 96.9 °F (36.1 °C) (Temporal)   Resp 16   Ht 1.676 m (5' 6\")   Wt 65.8 kg (145 lb)   SpO2 95%   BMI 23.40 kg/m²   General appearance: NAD, conversant, slight slurred speech  HEENT: AT/NC, MMM-left facial droop, mouth droop  Neck: FROM, supple  Lungs: Clear to auscultation  CV: RRR, no MRGs  Vasc: Radial pulses 2+  Abdomen: Soft, non-tender; no masses or HSM  Extremities: No peripheral edema or digital cyanosis  Skin: no rash, lesions or ulcers  Psych: Alert and oriented to person, place and time  Neuro: Left-sided facial droop, left upper and lower extremity strength decreased 3/5  No results for input(s): \"WBC\", \"HGB\", \"HCT\", \"PLT\" in the last 72 hours.      Recent Labs     05/10/25  0635      K 4.1      CO2 20*   BUN 20   CREATININE 0.8   CALCIUM 9.1       Assessment:    Principal Problem (Resolved):    Stroke-like symptoms  Active Problems:    Pre-diabetes    HLD (hyperlipidemia)    Moderate protein-calorie malnutrition    Acute ischemic stroke (HCC)    New onset a-fib (HCC)      Plan:    64-year-old man initially admitted for left-sided facial droop as well as left arm and leg weakness-acute stroke    RRT called overnight secondary to A-fib RVR  Above likely culprit of embolic stroke  Continue rate control  On IV heparin on my evaluation-this can be discontinued and transitioned  to Eliquis for stroke in setting of A-fib  PT OT evaluation with possibility of acute rehab  Blood pressure control-currently optimal  Hemoglobin A1c/-6.1-continue insulin sliding scale  lipid panel-markedly elevated-high intensity  Risk factor modification  MRI has 
    [facility]   Progress note      Subjective:  Denies chest pain and dyspnea  Denies palpitations and feelings of his heart racing  States left shoulder pain is much improved     Objective:  Sitting up in a chair, eating lunch, conversing without difficulty  No acute distress  No family present at the bedside  /70   Pulse 75   Temp 97.2 °F (36.2 °C) (Temporal)   Resp 18   Ht 1.676 m (5' 6\")   Wt 65.8 kg (145 lb)   SpO2 96%   BMI 23.40 kg/m²   General appearance: NAD, conversant, slight slurred speech  HEENT: AT/NC, MMM-left facial droop, mouth droop  Neck: FROM, supple  Lungs: Clear to auscultation  CV: RRR, no MRGs  Vasc: Radial pulses 2+  Abdomen: Soft, non-tender; no masses or HSM  Extremities: No peripheral edema or digital cyanosis  Skin: no rash, lesions or ulcers  Psych: Alert and oriented to person, place and time  Neuro: Left-sided facial droop, left upper and lower extremity strength decreased 3/5  No results for input(s): \"WBC\", \"HGB\", \"HCT\", \"PLT\" in the last 72 hours.      No results for input(s): \"NA\", \"K\", \"CL\", \"CO2\", \"BUN\", \"CREATININE\", \"GLU\", \"CALCIUM\" in the last 72 hours.      Assessment:    Principal Problem (Resolved):    Stroke-like symptoms  Active Problems:    Pre-diabetes    HLD (hyperlipidemia)    Moderate protein-calorie malnutrition    Acute ischemic stroke (HCC)    New onset a-fib (HCC)      Plan:    64-year-old man initially admitted for left-sided facial droop as well as left arm and leg weakness-acute stroke    RRT called overnight secondary to A-fib RVR  Above likely culprit of embolic stroke  Continue rate control  On IV heparin on my evaluation-this can be discontinued and transitioned  to Eliquis for stroke in setting of A-fib  PT OT evaluation with possibility of acute rehab  Blood pressure control-currently optimal  Hemoglobin A1c/-6.1-continue insulin sliding scale  lipid panel-markedly elevated-high intensity  Risk factor modification  MRI has 
  Inpatient consult to Orthopedic Surgery sent. Resident and Attending notified.      
  SPEECH/LANGUAGE PATHOLOGY  CLINICAL ASSESSMENT OF SWALLOWING FUNCTION   and PLAN OF CARE      PATIENT NAME:  Esteban Terry  (male)     MRN:  [id number]    :  1960  (64 y.o.)  STATUS:  Inpatient: Room 6501/6501-B    TODAY'S DATE:  2025  ORDER DATE, DESCRIPTION AND REFERRING PROVIDER:   25  SLP eval and treat  Start:  25,   End:  25,   ONE TIME,   Standing Count:  1 Occurrences,   R         Catalina House MD     REASON FOR REFERRAL: dysphagia   EVALUATING THERAPIST: OPAL Donaldson                 RESULTS:    DYSPHAGIA DIAGNOSIS:   Inconsistent signs of aspiration were noted.         DIET RECOMMENDATIONS:  Soft and bite size consistency solids (IDDSI level 6) with  honey consistency (moderately thick - IDDSI level 3)  liquids until MBSS is completed.     FEEDING RECOMMENDATIONS:     Assistance level:  Set-up is required for all oral intake      Compensatory strategies recommended: SINGLE cup sips  SMALL bites      Discussed recommendations with:   floor nurse (patient nurse and charge nurse unavailable)    SPEECH THERAPY  PLAN OF CARE   The dysphagia POC is established based on physician order, dysphagia diagnosis and results of clinical assessment     Will make further recommendations/changes to POC once MBSS is completed.    Conditions Requiring Skilled Therapeutic Intervention for dysphagia:    Patient is performing below functional baseline d/t  current acute condition, respiratory compromise, multiple medications, and/or increased dependency upon caregivers.    Specific dysphagia interventions to include:     MBSS to fully assess oropharyngeal swallow function and to assist in determining the least restrictive PO diet to maintain adequate nutrition/hydration     Specific instructions for next treatment:  MBSS to be completed  Patient Treatment Goals:    Short Term Goals:  On follow up MBSS will improve laryngeal closure as evidenced by decreased bolus entry into 
  SPEECH/LANGUAGE PATHOLOGY  VIDEOFLUOROSCOPIC STUDY OF SWALLOWING (MBS)   and PLAN OF CARE    PATIENT NAME:  Esteban Terry  (male)     MRN:  [id number]    :  1960  (64 y.o.)  STATUS:  Inpatient: Room 6501/6501-B    TODAY'S DATE:  2025  ORDER DATE, DESCRIPTION AND REFERRING PROVIDER:  25 : FL MODIFIED BARIUM SWALLOW W VIDEO :  DR House  REASON FOR REFERRAL: dysphagia   EVALUATING THERAPIST: OPAL Donaldson      RESULTS:      DYSPHAGIA DIAGNOSIS:  Clinical indicators of mild  oropharyngeal phase dysphagia     DIET RECOMMENDATIONS:  Minced and moist consistency solids (IDDSI level 5) with  thin liquids (IDDSI level 0)    FEEDING RECOMMENDATIONS:    Assistance level:  Set-up is required for all oral intake     Compensatory strategies recommended: Check for oral pocketing  SINGLE cup sips  NO STRAW     Discussed recommendations with:  charge nurse via phone       SPEECH THERAPY  PLAN OF CARE   The dysphagia POC is established based on physician order and dysphagia diagnosis    Skilled SLP intervention for dysphagia management on acute care up to 5 x per week until goals met, pt plateaus in function and/or discharged from hospital      Conditions Requiring Skilled Therapeutic Intervention for dysphagia:    Patient is performing below functional baseline d/t  current acute condition, Multiple diagnoses, multiple medications, and increased dependency upon caregivers.    SPECIFIC DYSPHAGIA INTERVENTIONS TO INCLUDE:     PO trials of upgraded diet textures with SLP only to determine the least restrictive PO diet     Specific instructions for next treatment:  initiate instruction of therapeutic exercises  and initiate instruction of compensatory strategies  Treatment Goals:    Short Term Goals:  Pt will participate in meal time assessment for 1-2 sessions to provide diet modification and compensatory strategy implementation to safely advance diet as functional ability improves    Long Term Goals:   Pt will 
  [facility] Quality Flow/Interdisciplinary Rounds Progress Note        Quality Flow Rounds held on May 7, 2025    Disciplines Attending:  Bedside Nurse, , , and Nursing Unit Leadership    Esteban Terry was admitted on 5/6/2025  5:49 PM    Anticipated Discharge Date:       Disposition:    Gennaro Score:  Gennaro Scale Score: 18    [facility] RISK OF UNPLANNED READMISSION 2.0             0 Total Score        Discussed patient goal for the day, patient clinical progression, and barriers to discharge.  The following Goal(s) of the Day/Commitment(s) have been identified:  Diagnostics - Report Results and Labs - Report Results      Eva Mays RN  May 7, 2025        
4 Eyes Skin Assessment     NAME:  Esteban Terry  YOB: 1960  MEDICAL RECORD NUMBER:  [id number]    The patient is being assessed for  Admission    I agree that at least one RN has performed a thorough Head to Toe Skin Assessment on the patient. ALL assessment sites listed below have been assessed.      Areas assessed by both nurses:    Head, Face, Ears, Shoulders, Back, Chest, Arms, Elbows, Hands, Sacrum. Buttock, Coccyx, Ischium, Legs. Feet and Heels, Under Medical Devices , and Other          Does the Patient have a Wound? No noted wound(s)       Gennaro Prevention initiated by RN: Yes  Wound Care Orders initiated by RN: No    Pressure Injury (Stage 3,4, Unstageable, DTI, NWPT, and Complex wounds) if present, place Wound referral order by RN under : No    New Ostomies, if present place, Ostomy referral order under : No     Nurse 1 eSignature: Electronically signed by Ilana Dias RN on 5/6/25 at 6:46 PM EDT    **SHARE this note so that the co-signing nurse can place an eSignature**    Nurse 2 eSignature: Electronically signed by Messi Abraham RN on 5/6/25 at 6:47 PM EDT  
Cardiology consult sent out to Anny Ryder for new onset Afib with RVR.  
Comprehensive Nutrition Assessment    Type and Reason for Visit:  Initial, Consult    Nutrition Recommendations/Plan:   Advance diet when medically appropriate.    When diet advances, will recommend and start appropriate nutritional supplementation to help meet increased nutritional needs and d/t decreased po intake of meals.    Please consult clinical nutrition for any further recommendations if needed.         Malnutrition Assessment:  Malnutrition Status:  Moderate malnutrition (05/08/25 1120)    Context:  Chronic Illness     Findings of the 6 clinical characteristics of malnutrition:  Energy Intake:  75% or less estimated energy requirements for 1 month or longer  Weight Loss:  Unable to assess (d/t poor recent weight history)     Body Fat Loss:  Mild body fat loss Orbital, Triceps   Muscle Mass Loss:  Mild muscle mass loss Clavicles (pectoralis & deltoids), Temples (temporalis)  Fluid Accumulation:  No fluid accumulation     Strength:  Not Performed    Nutrition Assessment:    Patient is currently NPO ; adm w/ stroke-like symptoms (L sided facial droop/L sided arm drop/L sided weakness) ;  noted acute right basal ganglia ischemic stroke and new onset a-fib with RVR ; pending echo ; s/p RRT on 5/7 ; noted pre-diabetes ; hx of GERD/HTN/hyperlipidemia/tobacco abuse ; noted decreased po intake/appetite PTA ; pt also meets criteria for moderate malnutrition ; per speech note on 5/8 - speech recommending diet be upgraded to soft and bite size consistency solids with thin liquids ;  will provide recommendations    Nutrition Related Findings:    I&Os WNL, no edema, slurred speech, A&O x 4, L facial droop, redness to buttocks, abrasions, exocoriation, hyperglycemia, muscle/fat wasting ; Wound Type: None       Current Nutrition Intake & Therapies:    Average Meal Intake: NPO     Diet NPO    Anthropometric Measures:  Height: 167.6 cm (5' 6\")  Ideal Body Weight (IBW): 142 lbs (65 kg)       Current Body Weight: 65.8 kg 
Comprehensive Nutrition Assessment    Type and Reason for Visit:  Reassess    Nutrition Recommendations/Plan:   Recommend and start Ensure high protein supplement BID and Magic cup supplement daily to help meet increased nutritional needs.         Malnutrition Assessment:  Malnutrition Status:  Moderate malnutrition (05/08/25 1120)    Context:  Chronic Illness     Findings of the 6 clinical characteristics of malnutrition:  Energy Intake:  75% or less estimated energy requirements for 1 month or longer  Weight Loss:  Unable to assess (d/t poor recent weight history)     Body Fat Loss:  Mild body fat loss Orbital, Triceps   Muscle Mass Loss:  Mild muscle mass loss Clavicles (pectoralis & deltoids), Temples (temporalis)  Fluid Accumulation:  No fluid accumulation     Strength:  Not Performed    Nutrition Assessment:    Patients diet has been advanced, averaging ~50% of meals served ; noted clinical indicators of mild oropharyngeal phase dysphagia per speech who recommended minced and moist consistency solids with thin liquids ; adm w/ stroke-like symptoms (L sided facial droop/L sided arm drop/L sided weakness) ; noted acute CVA/acute right basal ganglia ischemic stroke and new onset a-fib with RVR  ; s/p RRT on 5/7 ; noted pre-diabetes ; hx of GERD/HTN/hyperlipidemia/tobacco abuse ; noted decreased po intake/appetite PTA ; pt also meets criteria for moderate malnutrition  ; will provide updated recommendations    Nutrition Related Findings:    -I&Os (-1.5 L), no edema, slurred speech, abrasions, redness to coccyx, hyperglycemia, muscle/fat wasting ; Wound Type: None       Current Nutrition Intake & Therapies:    Average Meal Intake: 26-50%, 51-75% (~50%)     ADULT DIET; Dysphagia - Minced and Moist    Anthropometric Measures:  Height: 167.6 cm (5' 6\")  Ideal Body Weight (IBW): 142 lbs (65 kg)       Current Body Weight: 65.8 kg (145 lb) (5/6, no method ; unable to obtain bedscale weight at this time), 102.1 % IBW. 
Message left for Ana Garcia NP regarding patient requesting a lidocaine patch for his neck pain, awaiting call back or orders to be placed.   
Neurology consult sent via perfect serve.  
No zio needed on dc per K. Learn  
Occupational Therapy  OCCUPATIONAL THERAPY INITIAL EVALUATION    [facility]  [address], [place]      Date:2025                                                Patient Name: Esteban Terry  MRN: [id number]  : 1960  Room: [facility]650-B    Evaluating OT: Shaq Espinosa OTR/L #[id number]     Referring Provider:     Hayley Solitario APRN - CNP     Specific Provider Orders/Date: OT eval and treat 25    Diagnosis: Stroke-like symptoms [R29.90]   Pt admitted to hospital with L sided weakness and facial droop     Pertinent Medical History:  has a past medical history of Heartburn, Hyperlipidemia, Hypertension, Polyp of colon, and Smokers' cough (HCC).       Precautions:  Fall Risk, L hemiparesis, L knee buckling, bed/chair alarm    Assessment of current deficits    [x] Functional mobility  [x]ADLs  [x] Strength               []Cognition    [x] Functional transfers   [x] IADLs         [x] Safety Awareness   [x]Endurance    [x] Fine Coordination              [x] Balance      [x] Vision/perception   [x]Sensation     []Gross Motor Coordination  [x] ROM  [] Delirium                   [] Motor Control     OT PLAN OF CARE   OT POC based on physician orders, patient diagnosis and results of clinical assessment    Frequency/Duration 1-5 days/wk for 2 weeks PRN   Specific OT Treatment Interventions to include:   * Instruction/training on adapted ADL techniques and AE recommendations to increase functional independence within precautions       * Training on energy conservation strategies, correct breathing pattern and techniques to improve independence/tolerance for self-care routine  * Functional transfer/mobility training/DME recommendations for increased independence, safety, and fall prevention  * Patient/Family education to increase follow through with safety techniques and functional independence  * Recommendation of environmental modifications for 
Occupational Therapy  OT BEDSIDE TREATMENT NOTE   [facility]  [address], [place]      Date:2025  Patient Name: Esteban Terry  MRN: [id number]  : 1960  Room: [facility] [place]     Evaluating OT: Shaq Espinosa OTR/L #[id number]      Referring Provider:     Hayley Solitario APRN - CNP      Specific Provider Orders/Date: OT eval and treat 25     Diagnosis: Stroke-like symptoms [R29.90]   Pt admitted to hospital with L sided weakness and facial droop      Pertinent Medical History:  has a past medical history of Heartburn, Hyperlipidemia, Hypertension, Polyp of colon, and Smokers' cough (HCC).         Precautions:  Fall Risk, L hemiparesis, L knee buckling, bed/chair alarm     Assessment of current deficits    [x] Functional mobility          [x]ADLs           [x] Strength                  []Cognition    [x] Functional transfers        [x] IADLs         [x] Safety Awareness   [x]Endurance    [x] Fine Coordination                        [x] Balance      [x] Vision/perception   [x]Sensation      []Gross Motor Coordination            [x] ROM           [] Delirium                   [] Motor Control      OT PLAN OF CARE   OT POC based on physician orders, patient diagnosis and results of clinical assessment     Frequency/Duration 1-5 days/wk for 2 weeks PRN   Specific OT Treatment Interventions to include:   * Instruction/training on adapted ADL techniques and AE recommendations to increase functional independence within precautions       * Training on energy conservation strategies, correct breathing pattern and techniques to improve independence/tolerance for self-care routine  * Functional transfer/mobility training/DME recommendations for increased independence, safety, and fall prevention  * Patient/Family education to increase follow through with safety techniques and functional independence  * Recommendation of environmental modifications for 
Occupational Therapy  OT BEDSIDE TREATMENT NOTE   [facility]  [address], [place]      Date:2025  Patient Name: Esteban Terry  MRN: [id number]  : 1960  Room: [facility] [place]     Evaluating OT: Shaq Espinosa OTR/L #[id number]      Referring Provider:     Hayley Solitario APRN - CNP      Specific Provider Orders/Date: OT eval and treat 25     Diagnosis: Stroke-like symptoms [R29.90]   Pt admitted to hospital with L sided weakness and facial droop      Pertinent Medical History:  has a past medical history of Heartburn, Hyperlipidemia, Hypertension, Polyp of colon, and Smokers' cough (HCC).         Precautions:  Fall Risk, L hemiparesis, L knee buckling, bed/chair alarm     Assessment of current deficits    [x] Functional mobility          [x]ADLs           [x] Strength                  []Cognition    [x] Functional transfers        [x] IADLs         [x] Safety Awareness   [x]Endurance    [x] Fine Coordination                        [x] Balance      [x] Vision/perception   [x]Sensation      []Gross Motor Coordination            [x] ROM           [] Delirium                   [] Motor Control      OT PLAN OF CARE   OT POC based on physician orders, patient diagnosis and results of clinical assessment     Frequency/Duration 1-5 days/wk for 2 weeks PRN   Specific OT Treatment Interventions to include:   * Instruction/training on adapted ADL techniques and AE recommendations to increase functional independence within precautions       * Training on energy conservation strategies, correct breathing pattern and techniques to improve independence/tolerance for self-care routine  * Functional transfer/mobility training/DME recommendations for increased independence, safety, and fall prevention  * Patient/Family education to increase follow through with safety techniques and functional independence  * Recommendation of environmental modifications for 
Occupational Therapy  OT BEDSIDE TREATMENT NOTE   [facility]  [address], [place]      Date:2025  Patient Name: Esteban Terry  MRN: [id number]  : 1960  Room: [facility]     Evaluating OT: Shaq Espinosa OTR/L #[id number]      Referring Provider:     Hayley Solitario APRN - CNP      Specific Provider Orders/Date: OT eval and treat 25     Diagnosis: Stroke-like symptoms [R29.90]   Pt admitted to hospital with L sided weakness and facial droop      Pertinent Medical History:  has a past medical history of Heartburn, Hyperlipidemia, Hypertension, Polyp of colon, and Smokers' cough (HCC).         Precautions:  Fall Risk, L hemiparesis, L knee buckling, bed/chair alarm     Assessment of current deficits    [x] Functional mobility          [x]ADLs           [x] Strength                  []Cognition    [x] Functional transfers        [x] IADLs         [x] Safety Awareness   [x]Endurance    [x] Fine Coordination                        [x] Balance      [x] Vision/perception   [x]Sensation      []Gross Motor Coordination            [x] ROM           [] Delirium                   [] Motor Control      OT PLAN OF CARE   OT POC based on physician orders, patient diagnosis and results of clinical assessment     Frequency/Duration 1-5 days/wk for 2 weeks PRN   Specific OT Treatment Interventions to include:   * Instruction/training on adapted ADL techniques and AE recommendations to increase functional independence within precautions       * Training on energy conservation strategies, correct breathing pattern and techniques to improve independence/tolerance for self-care routine  * Functional transfer/mobility training/DME recommendations for increased independence, safety, and fall prevention  * Patient/Family education to increase follow through with safety techniques and functional independence  * Recommendation of environmental modifications for 
Patient coughing frequently on liquids, patient stated he feels like it's going down the wrong pipe when he drinks. Dr. Wilhelm group notified   
Physical Therapy  Treatment    Name: Esteban Terry  : 1960  MRN: [id number]      Date of Service: 2025    Evaluating PT:  Demarcus Camp, PT, DPT [id number]    Room #:  6501/6501-B  Diagnosis:  Stroke-like symptoms [R29.90]  PMHx/PSHx:    Past Medical History:   Diagnosis Date    Heartburn     Hyperlipidemia     Hypertension     Polyp of colon     Smokers' cough (HCC)      Procedure/Surgery:  none  Precautions:  Falls, alarms, L hemiparesis, L facial droop  Equipment Needs:  TBD    SUBJECTIVE:    Pt lives with wife in a 2 story home with \"a couple\" step(s) to enter and no rail(s).  First floor setup.  Pt ambulated without device and was independent PTA.    OBJECTIVE:   Initial Evaluation  Date: 25 Treatment  Date: 25 Short Term/ Long Term   Goals   AM-PAC 6 Clicks  - Pt would benefit from an intensive rehabilitation program.     Was pt agreeable to Eval/treatment? Yes Yes    Does pt have pain? 4/10 L shoulder pain No complaints of pain    Bed Mobility  Rolling: NT  Supine to sit: Kendra  Sit to supine: NT  Scooting: Kendra Rolling: NT  Supine to sit: Kendra  Sit to supine: NT  Scooting: Kendra Mod Independent   Transfers Sit to stand: ModA  Stand to sit: ModA  Stand pivot: MaxA no device Sit to stand: ModA  Stand to sit: ModA  Stand pivot: ModA with R HHA Kendra with AAD   Ambulation   3 feet forward/backwards with MaxA with WW 10 feet with R HHA with ModA x2 >50 feet with Kendra with AAD   Stair negotiation: ascended and descended NT NT >2 steps with 1 rail ModA   ROM BUE:  WFL  BLE:  WFL NT    Strength RUE: WFL  LUE:  global weakness: 2-/5   RLE:  4/5  L hip 3+/5  L knee 3-/5  L ankle 2+ to 3-/5 NT Increase by 1/3 MMT grade   Balance Sitting EOB:  Kendra dynamic  Dynamic Standing:  MaxA with WW or no device Sitting EOB: SBA   Dynamic Standing: ModA with R HHA Sitting EOB:  Independent  Dynamic Standing:  Kendra with AAD     Pt is A & O x: 4 to person, place, month/year, and situation.   Sensation: 
Physical Therapy  Treatment    Name: Esteban Terry  : 1960  MRN: [id number]      Date of Service: 2025    Evaluating PT:  Demarcus Camp, PT, DPT [id number]    Room #:  6501/6501-B  Diagnosis:  Stroke-like symptoms [R29.90]  PMHx/PSHx:    Past Medical History:   Diagnosis Date    Heartburn     Hyperlipidemia     Hypertension     Polyp of colon     Smokers' cough (HCC)      Procedure/Surgery:  none  Precautions:  Falls, alarms, L hemiparesis, L facial droop  Equipment Needs:  TBD    SUBJECTIVE:    Pt lives with wife in a 2 story home with \"a couple\" step(s) to enter and no rail(s).  First floor setup.  Pt ambulated without device and was independent PTA.    **Patient excellent candidate for ARU process\"    OBJECTIVE:   Initial Evaluation  Date: 25 Treatment  Date: 25 Short Term/ Long Term   Goals   AM-PAC 6 Clicks  - Pt would benefit from an intensive rehabilitation program.  Continues to be excellent candidate for ARU.     Was pt agreeable to Eval/treatment? Yes Yes    Does pt have pain? 4/10 L shoulder pain No complaints of pain    Bed Mobility  Rolling: NT  Supine to sit: Kendra  Sit to supine: NT  Scooting: Kendra Rolling: NT  Supine to sit: Kendra  Sit to supine: NT  Scooting: Kendra Mod Independent   Transfers Sit to stand: ModA  Stand to sit: ModA  Stand pivot: MaxA no device Sit to stand: ModA  Stand to sit: ModA  Stand pivot: ModA with R HHA Kendra with AAD   Ambulation   3 feet forward/backwards with MaxA with WW 20 feet with R HHA with ModA x2  L knee blocked for stability at stance phase of gait..  >50 feet with Kendra with AAD   Stair negotiation: ascended and descended NT NT >2 steps with 1 rail ModA   ROM BUE:  WFL  BLE:  WFL NT    Strength RUE: WFL  LUE:  global weakness: 2-/5   RLE:  4/5  L hip 3+/5  L knee 3-/5  L ankle 2+ to 3-/5 NT Increase by 1/3 MMT grade   Balance Sitting EOB:  Kendra dynamic  Dynamic Standing:  MaxA with WW or no device Sitting EOB: SBA   Dynamic Standing: ModA 
Physical Therapy  Treatment Note    Name: Esteban Terry  : 1960  MRN: [id number]      Date of Service: 2025    Evaluating PT:  Demarcus Camp, PT, DPT [id number]    Room #:  6501/6501-B  Diagnosis:  Stroke-like symptoms [R29.90]  PMHx/PSHx:    Past Medical History:   Diagnosis Date    Heartburn     Hyperlipidemia     Hypertension     Polyp of colon     Smokers' cough (HCC)      Procedure/Surgery:  none  Precautions:  Falls, alarms, L hemiparesis, L facial droop  Equipment Needs:  TBD    SUBJECTIVE:    Pt lives with wife in a 2 story home with \"a couple\" step(s) to enter and no rail(s).  First floor setup.  Pt ambulated without device and was independent PTA.    OBJECTIVE:   Initial Evaluation  Date: 25 Treatment  Date: 25 Short Term/ Long Term   Goals   AM-PAC 6 Clicks  - Pt would benefit from an intensive rehabilitation program.     Was pt agreeable to Eval/treatment? Yes yes    Does pt have pain? 4/10 L shoulder pain No pain    Bed Mobility  Rolling: NT  Supine to sit: Kendra  Sit to supine: NT  Scooting: Kendra Rolling: NT  Supine to sit: min A  Sit to supine: NT  Scooting: min A Mod Independent   Transfers Sit to stand: ModA  Stand to sit: ModA  Stand pivot: MaxA no device Sit to stand: mod A  Stand to sit: mod A  Stand pivot: mod Ax2 with R HHA Kendra with AAD   Ambulation   3 feet forward/backwards with MaxA with WW 20'x2 with R HHA mod Ax2 >50 feet with Kendra with AAD   Stair negotiation: ascended and descended NT NT >2 steps with 1 rail ModA   ROM BUE:  WFL  BLE:  WFL     Strength RUE: WFL  LUE:  global weakness: 2-/5   RLE:  4/5  L hip 3+/5  L knee 3-/5  L ankle 2+ to 3-/5  Increase by 1/3 MMT grade   Balance Sitting EOB:  Kendra dynamic  Dynamic Standing:  MaxA with WW or no device Sitting EOB:  SBA   Dynamic Standing:  mod Ax2 with R HHA Sitting EOB:  Independent  Dynamic Standing:  Kendra with AAD     Pt is A & O x 3  Sensation:  Pt denies numbness and tingling to extremities  Edema:  
RRT called for SVT HR 180s, Dr. Graff on unit and aware of changes and RRT.  
SPEECH LANGUAGE PATHOLOGY  DAILY PROGRESS NOTE        PATIENT NAME:  Esteban Terry      ROOM:  6501/6501-B :  1960         TODAY'S DATE:  2025       Due to prior laryngeal penetration during MBSS a bedside swallow evaluation may not be reliable and therefore a repeat MBSS is recommended.   A physician order is needed and the evaluation can be done tomorrow with an order.   
SPEECH LANGUAGE PATHOLOGY  DAILY PROGRESS NOTE        PATIENT NAME:  Esteban Terry      ROOM:  6501/6501-B :  1960         TODAY'S DATE:  2025       Patient seen for dysphagia x2      Current Diet Order: ADULT DIET; Dysphagia - Minced and Moist  ADULT ORAL NUTRITION SUPPLEMENT; Breakfast, Dinner; Low Calorie/High Protein Oral Supplement  ADULT ORAL NUTRITION SUPPLEMENT; Lunch; Frozen Oral Supplement  Results and recommendations of swallowing evaluation reviewed.  Pt monitored during mealtime.  Tolerating current diet without noted or reported difficulties. Appetite was fair +  Oral motor strength/ coordination exercises to improve bolus prep/ control and mastication were completed with  moderate verbal prompts .        Will continue  
SPEECH LANGUAGE PATHOLOGY  DAILY PROGRESS NOTE        PATIENT NAME:  Esteban Terry      ROOM:  6501/6501-B :  1960         TODAY'S DATE:  2025       Patient seen for dysphagia x2      Current Diet Order: ADULT DIET; Dysphagia - Soft and Bite Sized; Moderately Thick (Honey)  Trials of upgraded diet textures to determine the least restrictive PO diet to maintain adequate nutrition/hydration were completed . Recommend diet be upgraded to Soft and bite size consistency solids (IDDSI level 6) with  thin liquids (IDDSI level 0)    Compensatory strategies recommended include:   Small sips   No straw   Check for oral pocketing  Compensatory strategies were reviewed and pt was able to recall  them with minimal verbal prompts  Oral motor strength/ coordination exercises to improve bolus prep/ control and mastication were completed with  minimal verbal prompts .  BOTR strength/ ROM exercises to reduce pharyngeal residuals and improve epiglottic inversion were completed with minimal verbal prompts  Laryngeal strength/ ROM therapeutic exercises to improve airway protection for the least restrictive PO diet  were completed with minimal verbal prompts        Will continue  
SPEECH/LANGUAGE PATHOLOGY  VIDEOFLUOROSCOPIC STUDY OF SWALLOWING (MBS)   and PLAN OF CARE    PATIENT NAME:  Esteban Terry  (male)     MRN:  [id number]    :  1960  (64 y.o.)  STATUS:  Inpatient: Room 6501/6501-B    TODAY'S DATE:  2025  ORDER DATE, DESCRIPTION AND REFERRING PROVIDER:  25 : FL MODIFIED BARIUM SWALLOW W VIDEO :    REASON FOR REFERRAL: dysphagia   EVALUATING THERAPIST: OPAL Donaldson      RESULTS:      DYSPHAGIA DIAGNOSIS:  Clinical indicators of mild  oral phase dysphagia     DIET RECOMMENDATIONS:  Soft and bite size consistency solids (IDDSI level 6) with  thin liquids (IDDSI level 0)    FEEDING RECOMMENDATIONS:    Assistance level:  No assistance needed     Compensatory strategies recommended: Check for oral pocketing     Discussed recommendations with:  patient nurse via phone      SPEECH THERAPY  PLAN OF CARE   The dysphagia POC is established based on physician order and dysphagia diagnosis    Skilled SLP intervention for dysphagia management on acute care up to 5 x per week until goals met, pt plateaus in function and/or discharged from hospital      Conditions Requiring Skilled Therapeutic Intervention for dysphagia:    Patient is performing below functional baseline d/t  current acute condition, Multiple diagnoses, multiple medications, and increased dependency upon caregivers.    SPECIFIC DYSPHAGIA INTERVENTIONS TO INCLUDE:     PO trials of upgraded diet textures with SLP only to determine the least restrictive PO diet   oral motor strength/ coordination exercises to improve bolus prep/ control and mastication    Specific instructions for next treatment:  initiate instruction of therapeutic exercises  and initiate instruction of compensatory strategies  Treatment Goals:    Short Term Goals:  Pt will implement identified compensatory swallowing strategies on 90% of opportunities or greater to improve airway protection and swallow function.  Pt will improve bolus prep/control 
Spiritual Health History and Assessment/Progress Note  [facility] Yasmin Celeste    (P) Initial Encounter,  ,  ,      Name: Esteban Terry MRN: [id number]    Age: 64 y.o.     Sex: male   Language: English   Restorationism: None   Stroke-like symptoms     Date: 5/6/2025                           Spiritual Assessment began in [facility]        Referral/Consult From: (P) Rounding   Encounter Overview/Reason: (P) Initial Encounter  Service Provided For: (P) Patient    [place], Belief, Meaning:   Patient identifies as spiritual  Family/Friends No family/friends present      Importance and Influence:  Patient has spiritual/personal beliefs that influence decisions regarding their health  Family/Friends No family/friends present    Community:  Patient is connected with a spiritual community and feels well-supported. Support system includes: Friends  Family/Friends No family/friends present    Assessment and Plan of Care:     Patient Interventions include: Facilitated expression of thoughts and feelings, Explored spiritual coping/struggle/distress, Affirmed coping skills/support systems, and Provided sacramental/Druze ritual  Family/Friends Interventions include: No family/friends present    Patient Plan of Care: Spiritual Care available upon further referral  Family/Friends Plan of Care: No family/friends present    Electronically signed by LARISA ROME on 5/6/2025 at 7:24 PM   
Spoke with CLEVE Garcia NP on unit regarding standing admit order to notify for diastolic < 60. /54 with a map of 79.    Eva Mays RN    
Spoke with cardiology, patient does not need to be NPO  
    ASSESSMENT:    Conditions Requiring Skilled Therapeutic Intervention:    [x]Decreased strength     []Decreased ROM  [x]Decreased functional mobility  [x]Decreased balance   [x]Decreased endurance   [x]Decreased posture  [x]Decreased sensation  [x]Decreased coordination   []Decreased vision  []Decreased safety awareness   []Increased pain       Comments:  Pt was in bed upon arrival, agreeable to initial evaluation.  L hemiparesis apparent but able to initiate all movement.  Mild L inattention causing a lateral lean in sitting.  L knee block required during standing.  WW given for attempted ambulation but poor L hand grasp.  Pt required cues for proper sequencing, weight shifting and to achieve L TKE in stance phase.  Pt was left in a chair with all needs met and call light in reach.  Educated pt on safety and need for assistance when getting out of chair; pt understood and agreed to use call light.  Chair alarm on and RN aware.  Pt would benefit from an intensive rehabilitation program.    Treatment:  Patient practiced and was instructed in the following treatment:    Bed mobility training - pt given verbal and tactile cues to facilitate proper sequencing and safety during supine>sit as well as provided with physical assistance.  Sitting EOB for >5 minutes for upright tolerance, postural awareness and BLE ROM  Transfer training - pt was given verbal and tactile cues to facilitate proper hand placement, technique and safety during sit to stand, stand to sit and stand pivot transfers as well as provided with physical assistance.   Gait training- pt was given verbal and tactile cues to facilitate safety, balance and use of WW during ambulation as well as provided with physical assistance.    Pt's/ family goals   1. Return to PLOF    Prognosis is good for reaching above PT goals.    Patient and or family understand(s) diagnosis, prognosis, and plan of care:   [x] Yes [] No      PHYSICAL THERAPY PLAN OF CARE:    PT 
  Cuing for positioning of L UE at times                      Comments:  Cleared by RN to see pt. Upon arrival patient supine in bed and agreeable to OT session. At end of session, patient sitting in chair with call light and phone within reach, all lines and tubes intact. Overall patient demonstrated decreased independence and safety during completion of ADL and transitional movements    Treatment: includes above grid, therapist facilitated ADL tasks, bed mobility to address safety awareness, implementation of fall prevention strategies, & safety awareness throughout ADLs. Pt tolerated L shoulder PROM and instructed in self assisted range of motion using RUE to assist LUE in digit, wrist, elbow flexion/ext to reduce risk for contractures. Pt would benefit from continued skilled OT to increase safety and independence with completion of ADL/IADL tasks for functional independence and quality of life.    Education: Pt educated on role of OT in acute setting, benefits of participation in ADL tasks, compensatory methods, use of call light in room, expectation of rehab post-DC.     Pt has made fair progress towards set goals.     Continue with current plan of care    Treatment Time In:1140            Treatment Time Out: 1200             Treatment Charges: Mins Units   Ther Ex  [id number]     Manual Therapy 60217     Thera Activities 55059 20 1   ADL/Home Mgt [id number]     Neuro Re-ed [id number]     Group Therapy      Orthotic manage/training  [id number]     Non-Billable Time     Total Timed Treatment 20 1     Meghan Rulf ROCA/L [id number]

## 2025-05-14 NOTE — CARE COORDINATION
5/14/25  Transition of care update.  Pre-cert obtained for ARU at [facility]. Patient is planned for a MBS this morning. Transport set up for 2 pm with Ren  to [facility] pending no discharge barriers with MBS. Nurse to Nurse to be called to [phone]. Update to nursing and patient. Transport stated and on the soft chart. BROOKE/ARASH to follow.    Electronically signed by RICHARD Saavedra on 5/14/2025 at 10:29 AM

## 2025-05-14 NOTE — PLAN OF CARE
Problem: Discharge Planning  Goal: Discharge to home or other facility with appropriate resources  5/14/2025 0022 by Renetta Brown, RN  Outcome: Progressing     Problem: Skin/Tissue Integrity  Goal: Skin integrity remains intact  Description: 1.  Monitor for areas of redness and/or skin breakdown2.  Assess vascular access sites hourly3.  Every 4-6 hours minimum:  Change oxygen saturation probe site4.  Every 4-6 hours:  If on nasal continuous positive airway pressure, respiratory therapy assess nares and determine need for appliance change or resting period  5/14/2025 0022 by Renetta Brown, RN  Outcome: Progressing  Flowsheets (Taken 5/14/2025 0021)  Skin Integrity Remains Intact: Monitor for areas of redness and/or skin breakdown     Problem: Safety - Adult  Goal: Free from fall injury  5/14/2025 0022 by Renetta Brown, RN  Outcome: Progressing     Problem: ABCDS Injury Assessment  Goal: Absence of physical injury  Outcome: Progressing     Problem: Nutrition Deficit:  Goal: Optimize nutritional status  Outcome: Progressing     Problem: Chronic Conditions and Co-morbidities  Goal: Patient's chronic conditions and co-morbidity symptoms are monitored and maintained or improved  Outcome: Progressing

## 2025-05-14 NOTE — PATIENT CARE CONFERENCE
P Quality Flow/Interdisciplinary Rounds Progress Note        Quality Flow Rounds held on May 14, 2025    Disciplines Attending:  Bedside Nurse, , , and Nursing Unit Leadership    Esteban Terry was admitted on 5/6/2025  5:49 PM    Anticipated Discharge Date:       Disposition:    Gennaro Score:  Gennaro Scale Score: 18    [facility] RISK OF UNPLANNED READMISSION 2.0             12.1 Total Score        Discussed patient goal for the day, patient clinical progression, and barriers to discharge.  The following Goal(s) of the Day/Commitment(s) have been identified:  testing/discharge plan      Janeth Desouza RN  May 14, 2025

## 2025-05-14 NOTE — DISCHARGE INSTR - COC
Continuity of Care Form    Patient Name: Esteban Terry   :  1960  MRN:  [id number]    Admit date:  2025  Discharge date:  25    Code Status Order: Full Code   Advance Directives:     Admitting Physician:  Catalina House MD  PCP: Carl Monroy MD    Discharging Nurse: Janeth  Discharging Hospital Unit/Room#: 6501/6501-B  Discharging Unit Phone Number:     Emergency Contact:   Extended Emergency Contact Information  Primary Emergency Contact: Rosa Isela Celis  Address: [address] [place] [address]  Home Phone: [phone]  Mobile Phone: [phone]  Relation: Other    Past Surgical History:  Past Surgical History:   Procedure Laterality Date    APPENDECTOMY      COLONOSCOPY      COLONOSCOPY N/A 3/16/2020    COLORECTAL CANCER SCREENING, NOT HIGH RISK performed by Niall Calvert III, MD at [facility] ENDOSCOPY    HERNIA REPAIR      SKIN CANCER EXCISION Left     SIDE OF FACE       Immunization History:     There is no immunization history on file for this patient.    Active Problems:  Patient Active Problem List   Diagnosis Code    Gastroesophageal reflux disease without esophagitis K21.9    Pre-diabetes R73.03    HLD (hyperlipidemia) E78.5    Moderate protein-calorie malnutrition E44.0    Acute ischemic stroke (HCC) I63.9    New onset a-fib (HCC) I48.91       Isolation/Infection:   Isolation            No Isolation          Patient Infection Status    None to display         Nurse Assessment:  Last Vital Signs: /79   Pulse 72   Temp 97.8 °F (36.6 °C) (Temporal)   Resp 18   Ht 1.676 m (5' 6\")   Wt 65.8 kg (145 lb)   SpO2 96%   BMI 23.40 kg/m²     Last documented pain score (0-10 scale): Pain Level: 0  Last Weight:   Wt Readings from Last 1 Encounters:   25 65.8 kg (145 lb)     Mental Status:  oriented and alert    IV Access:  - None    Nursing Mobility/ADLs:  Walking   Assisted  Transfer  Assisted  Bathing  Assisted  Dressing

## 2025-05-14 NOTE — PROGRESS NOTES
Patient is admitted to [facility].  All documentation in [employer].      Dion Kinney DO

## 2025-06-09 ENCOUNTER — TELEPHONE (OUTPATIENT)
Dept: ADMINISTRATIVE | Age: 65
End: 2025-06-09

## (undated) DEVICE — GRADUATE TRIANG MEASURE 1000ML BLK PRNT

## (undated) DEVICE — SPONGE GZ W4XL4IN RAYON POLY FILL CVR W/ NONWOVEN FAB